# Patient Record
Sex: FEMALE | Race: ASIAN | NOT HISPANIC OR LATINO | Employment: UNEMPLOYED | URBAN - METROPOLITAN AREA
[De-identification: names, ages, dates, MRNs, and addresses within clinical notes are randomized per-mention and may not be internally consistent; named-entity substitution may affect disease eponyms.]

---

## 2022-07-15 ENCOUNTER — VIRTUAL VISIT (OUTPATIENT)
Dept: INTERNAL MEDICINE | Facility: CLINIC | Age: 47
End: 2022-07-15
Payer: COMMERCIAL

## 2022-07-15 DIAGNOSIS — R07.9 CHEST PAIN, UNSPECIFIED TYPE: Primary | ICD-10-CM

## 2022-07-15 PROCEDURE — 99204 OFFICE O/P NEW MOD 45 MIN: CPT | Mod: 95 | Performed by: INTERNAL MEDICINE

## 2022-07-15 NOTE — PROGRESS NOTES
Janell is a 46 year old who is being evaluated via a billable video visit.      How would you like to obtain your AVS? MyChart  If the video visit is dropped, the invitation should be resent by: Send to e-mail at: [email or web address]  Will anyone else be joining your video visit? No    Assessment & Plan     Chest pain, unspecified type  Unclear etiology. Given that it is always present and nothing makes it better/worse, I think ACS is lower on the DDX. Other possibilities include myocarditis (started after recent viral illness though doesn't worsen with position) vs PE (no other symptoms of PE though no vitals done today given virtual visit) vs GERD vs MSK strain vs other. Discussed all of these with Janell. Initially she was interested in pursuing blood tests + TTE + EKG but then changed her mind, preferring to see a provider near her (she lives in [place], our clinic is in [place]) instead. She plans to monitor her symptoms and see a provider near her in the coming days. I discussed ER precautions repeatedly, and she agreed to present if this pain worsens or additional symptoms occur.    F/u with provider as choice as above    Ron Payton MD  [facility]    Subjective   Janell is a 46 year old presenting via video visit for the following health issues. This is the first time I have met Janell.    HPI     Chest Pain  Onset/Duration: 2.5 weeks  Description:   Location: upper left side to  Under left breast, than to side and now in back on left side   Character: sharp and heavy  Radiation: on left side  Duration: constant   Intensity: mild, moderate  Progression of Symptoms: same  Accompanying Signs & Symptoms:  Shortness of breath: No  Sweating: No  Nausea/vomiting: No  Lightheadedness: No  Palpitations: No  Fever/Chills: No  Cough: YES started with a cold            Heartburn: YES  History:   Family history of heart disease: No  Tobacco use: No  Previous similar  "symptoms: no   Precipitating factors:   Worse with exertion: No  Worse with deep breaths: YES           Related to eating: No           Better with burping: No  Alleviating factors:   Therapies tried and outcome:     Has not had BP checked since arlene at mother-in-law's. Exercises every day. Reports she has acid reflux and indigestion a lot. Reports the last 2.5 weeks have had a different chest pain. Hurts to breath. Started L side, upper breast, then moved to below breast, now onto her back. Pain doesn't impact her to the point where she doesn't live her day to day life. It's always there, less than a 5 out of 10. \"It's always there, nothing makes it better or worse.\" Sleeps fine with it. There when she falls asleep and when she wakes up. Doesn't get worse when she lays down. No known personal medical history. No family history of cardiac disease that she knows about. Pain started when she had a mild cold. No leg swelling or difficulty breathing.    Review of Systems   Constitutional, HEENT, cardiovascular, pulmonary, gi systems are negative, except as otherwise noted.      Objective       Vitals: No vitals were obtained today due to virtual visit.    Physical Exam   GENERAL: Healthy, alert and no distress  EYES: Eyes grossly normal to inspection.  No discharge or erythema, or obvious scleral/conjunctival abnormalities.  RESP: No audible wheeze, cough, or visible cyanosis.  No visible retractions or increased work of breathing.    SKIN: Visible skin clear. No significant rash, abnormal pigmentation or lesions.  NEURO: Cranial nerves grossly intact.  Mentation and speech appropriate for age.  PSYCH: Mentation appears normal, affect normal/bright, judgement and insight intact, normal speech and appearance well-groomed.    Video-Visit Details  Video Start Time: 3:37 PM    Type of service: Video Visit    Video End Time: 3:54 PM    Originating Location (pt. Location): Home    Distant Location (provider location):  M " [facility]     Platform used for Video Visit: [employer]

## 2022-08-04 ENCOUNTER — OFFICE VISIT (OUTPATIENT)
Dept: FAMILY MEDICINE | Facility: CLINIC | Age: 47
End: 2022-08-04
Payer: COMMERCIAL

## 2022-08-04 VITALS
WEIGHT: 157 LBS | HEART RATE: 87 BPM | BODY MASS INDEX: 24.64 KG/M2 | HEIGHT: 67 IN | DIASTOLIC BLOOD PRESSURE: 86 MMHG | TEMPERATURE: 97 F | SYSTOLIC BLOOD PRESSURE: 128 MMHG | OXYGEN SATURATION: 100 %

## 2022-08-04 DIAGNOSIS — N92.6 IRREGULAR MENSES: ICD-10-CM

## 2022-08-04 DIAGNOSIS — R07.89 DISCOMFORT IN CHEST: ICD-10-CM

## 2022-08-04 DIAGNOSIS — F41.1 GAD (GENERALIZED ANXIETY DISORDER): Primary | ICD-10-CM

## 2022-08-04 PROCEDURE — 96127 BRIEF EMOTIONAL/BEHAV ASSMT: CPT | Performed by: PHYSICIAN ASSISTANT

## 2022-08-04 PROCEDURE — 99214 OFFICE O/P EST MOD 30 MIN: CPT | Performed by: PHYSICIAN ASSISTANT

## 2022-08-04 PROCEDURE — 93000 ELECTROCARDIOGRAM COMPLETE: CPT | Performed by: PHYSICIAN ASSISTANT

## 2022-08-04 RX ORDER — CITALOPRAM HYDROBROMIDE 20 MG/1
TABLET ORAL
Qty: 30 TABLET | Refills: 1 | Status: SHIPPED | OUTPATIENT
Start: 2022-08-04 | End: 2022-09-26

## 2022-08-04 ASSESSMENT — ANXIETY QUESTIONNAIRES
2. NOT BEING ABLE TO STOP OR CONTROL WORRYING: SEVERAL DAYS
8. IF YOU CHECKED OFF ANY PROBLEMS, HOW DIFFICULT HAVE THESE MADE IT FOR YOU TO DO YOUR WORK, TAKE CARE OF THINGS AT HOME, OR GET ALONG WITH OTHER PEOPLE?: SOMEWHAT DIFFICULT
7. FEELING AFRAID AS IF SOMETHING AWFUL MIGHT HAPPEN: NOT AT ALL
IF YOU CHECKED OFF ANY PROBLEMS ON THIS QUESTIONNAIRE, HOW DIFFICULT HAVE THESE PROBLEMS MADE IT FOR YOU TO DO YOUR WORK, TAKE CARE OF THINGS AT HOME, OR GET ALONG WITH OTHER PEOPLE: SOMEWHAT DIFFICULT
GAD7 TOTAL SCORE: 11
3. WORRYING TOO MUCH ABOUT DIFFERENT THINGS: NOT AT ALL
6. BECOMING EASILY ANNOYED OR IRRITABLE: NEARLY EVERY DAY
5. BEING SO RESTLESS THAT IT IS HARD TO SIT STILL: NEARLY EVERY DAY
GAD7 TOTAL SCORE: 11
1. FEELING NERVOUS, ANXIOUS, OR ON EDGE: SEVERAL DAYS
GAD7 TOTAL SCORE: 11
4. TROUBLE RELAXING: NEARLY EVERY DAY
7. FEELING AFRAID AS IF SOMETHING AWFUL MIGHT HAPPEN: NOT AT ALL

## 2022-08-04 ASSESSMENT — PAIN SCALES - GENERAL: PAINLEVEL: NO PAIN (0)

## 2022-08-04 ASSESSMENT — PATIENT HEALTH QUESTIONNAIRE - PHQ9: SUM OF ALL RESPONSES TO PHQ QUESTIONS 1-9: 3

## 2022-08-04 NOTE — PATIENT INSTRUCTIONS
Okay to schedule your routine appointment with GYN for Pap and mammogram and prescription for hormones.       I will see you in 4-6 weeks for follow up of medication for anxiety

## 2022-08-04 NOTE — NURSING NOTE
"Chief Complaint   Patient presents with     Establish Care       Initial BP (!) 135/91   Pulse 87   Temp 97  F (36.1  C) (Tympanic)   Ht 1.689 m (5' 6.5\")   Wt 71.2 kg (157 lb)   LMP 07/29/2022   SpO2 100%   BMI 24.96 kg/m   Estimated body mass index is 24.96 kg/m  as calculated from the following:    Height as of this encounter: 1.689 m (5' 6.5\").    Weight as of this encounter: 71.2 kg (157 lb).  Medication Reconciliation: complete    ALLISON Michaels MA    "

## 2022-08-04 NOTE — PROGRESS NOTES
"    Assessment & Plan     AGNESH (generalized anxiety disorder)  Discussed treatments with counseling and medications.   Risks, benefits and side effects reviewed.   She is going to start citalopram.   Plan follow up in 1-2 months, sooner if needed.   - citalopram (CELEXA) 20 MG tablet; 1/2 tablet daily x 6 days, then increase to 1 tablet daily    Discomfort in chest  EKG completed today and normal.   She declines further testing at this time.   The pain has resolved. Suspect it was most likely GERD.   - EKG 12-lead complete w/read - Clinics    Irregular menses  Referral placed.   Discuss fertility and progesterone, she plans to have her routine exam for Pap screening.   Defers scheduling mammogram.   - Ob/Gyn Referral; Future             30 minutes spent with Janell today, documentation, review of chart, appointment and discussion    Return in about 1 month (around 9/4/2022) for depression / anxiety, virtual video visit.    Kristen M. Kehr, PA-C [facility]   Janell is a 46 year old, presenting for the following health issues:  Establish Care    Histoyry of gestational diabetes. She is checked annually for diabetes and has not developed type 2 diabetes.   She gained weigh after her pregnancy. She has developed intolerances to foods.   She saw a hormone provider and had a \"Luxembourger\" test. She was told that her cortisol was \"off the charts\". She is an anxious person by nature.   Thyroid tests were normal.   She has very vivid dreams every night and feels anxious all of the time.   She has been taking medications / supplements from the hormone provider. She is taking supplements in the morning to help wake and then more to help settle down.   She has a hard time focusing.   She started having chest pains last month. She started to work on calming exercises with yoga and low intensity. Trying more calming exercises.   The chest pain is no longer present. She believes she may have had " "more acid reflux. She continues to have heartburn occasionally.   She does not have chest pain with activity.       She also will need a referral to GYN for management of her irregular menses, she has been taking progesterone monthly. She had fertility treatments in the [facility], she has some questions about trying to have more children in the future.       History of Present Illness       Mental Health Follow-up:  Patient presents to follow-up on Anxiety.    Patient's anxiety since last visit has been:  Good  The patient is having other symptoms associated with anxiety.  Any significant life events: relationship concerns, job concerns, financial concerns, housing concerns, grief or loss, health concerns and other  Patient is not feeling anxious or having panic attacks.  Patient has no concerns about alcohol or drug use.    She eats 2-3 servings of fruits and vegetables daily.She consumes 0 sweetened beverage(s) daily.She exercises with enough effort to increase her heart rate 60 or more minutes per day.  She exercises with enough effort to increase her heart rate 7 days per week.   She is taking medications regularly.  Today's GANESH-7 Score: 11           Review of Systems   Constitutional, HEENT, cardiovascular, pulmonary, GI, , musculoskeletal, neuro, skin, endocrine and psych systems are negative, except as otherwise noted.      Objective    /86   Pulse 87   Temp 97  F (36.1  C) (Tympanic)   Ht 1.689 m (5' 6.5\")   Wt 71.2 kg (157 lb)   LMP 07/29/2022   SpO2 100%   BMI 24.96 kg/m    Body mass index is 24.96 kg/m .  Physical Exam   GENERAL: healthy, alert and no distress  NECK: no adenopathy, no asymmetry, masses, or scars and thyroid normal to palpation  RESP: lungs clear to auscultation - no rales, rhonchi or wheezes  CV: regular rate and rhythm, normal S1 S2, no S3 or S4, no murmur, click or rub, no peripheral edema and peripheral pulses strong  ABDOMEN: soft, nontender, no hepatosplenomegaly, no " masses and bowel sounds normal  MS: no gross musculoskeletal defects noted, no edema  SKIN: no suspicious lesions or rashes  NEURO: Normal strength and tone, mentation intact and speech normal  BACK: no CVA tenderness, no paralumbar tenderness  PSYCH: mentation appears normal, affect normal/bright, anxious, judgement and insight intact and appearance well groomed    EKG - Reviewed and interpreted by me appears normal, NSR, normal axis, normal intervals, no acute ST/T changes c/w ischemia, no LVH by voltage criteria, unchanged from previous tracings                .  ..

## 2022-09-12 ENCOUNTER — OFFICE VISIT (OUTPATIENT)
Dept: OBGYN | Facility: CLINIC | Age: 47
End: 2022-09-12
Payer: COMMERCIAL

## 2022-09-12 VITALS
SYSTOLIC BLOOD PRESSURE: 142 MMHG | BODY MASS INDEX: 25.65 KG/M2 | OXYGEN SATURATION: 95 % | DIASTOLIC BLOOD PRESSURE: 95 MMHG | WEIGHT: 163.4 LBS | HEART RATE: 78 BPM | HEIGHT: 67 IN

## 2022-09-12 DIAGNOSIS — Z13.1 SCREENING FOR DIABETES MELLITUS: ICD-10-CM

## 2022-09-12 DIAGNOSIS — Z12.31 VISIT FOR SCREENING MAMMOGRAM: ICD-10-CM

## 2022-09-12 DIAGNOSIS — Z01.419 ENCOUNTER FOR GYNECOLOGICAL EXAMINATION WITHOUT ABNORMAL FINDING: Primary | ICD-10-CM

## 2022-09-12 DIAGNOSIS — Z13.220 SCREENING FOR HYPERLIPIDEMIA: ICD-10-CM

## 2022-09-12 DIAGNOSIS — N92.6 IRREGULAR MENSTRUAL CYCLE: ICD-10-CM

## 2022-09-12 PROCEDURE — 87624 HPV HI-RISK TYP POOLED RSLT: CPT | Performed by: NURSE PRACTITIONER

## 2022-09-12 PROCEDURE — 99386 PREV VISIT NEW AGE 40-64: CPT | Performed by: NURSE PRACTITIONER

## 2022-09-12 PROCEDURE — G0145 SCR C/V CYTO,THINLAYER,RESCR: HCPCS | Performed by: NURSE PRACTITIONER

## 2022-09-12 ASSESSMENT — ENCOUNTER SYMPTOMS
NERVOUS/ANXIOUS: 0
CHILLS: 0
PALPITATIONS: 0
ARTHRALGIAS: 0
EYE PAIN: 0
HEARTBURN: 1
WEAKNESS: 0
DIZZINESS: 0
PARESTHESIAS: 0
FEVER: 0
COUGH: 0
SHORTNESS OF BREATH: 0
SORE THROAT: 0
JOINT SWELLING: 0
MYALGIAS: 0
BREAST MASS: 0
HEMATOCHEZIA: 0
FREQUENCY: 0
HEMATURIA: 0
DIARRHEA: 0
ABDOMINAL PAIN: 0
NAUSEA: 0
CONSTIPATION: 0
HEADACHES: 0
DYSURIA: 0

## 2022-09-12 ASSESSMENT — PAIN SCALES - GENERAL: PAINLEVEL: NO PAIN (0)

## 2022-09-12 NOTE — PATIENT INSTRUCTIONS
If you have any questions regarding your visit, Please contact your care team.     [facility]: [phone]  To Schedule an Appointment 24/7  Call: [phone] HOURS TELEPHONE NUMBER     Ana Archer- APRN CNP      Roxana Santizo-RORO Vila-RORO [place]-Surgery Scheduler  Kamila-Surgery Scheduler         Monday 7:30 am-5:00 pm    Tuesday 8:00 am-4:00 pm    Wednesday 7:30 am-4:00 pm  [place]    Thursday 8:00 am-11:00 am    Friday 7:30 am-4:00 pm [place] [address] [place] [address] [phone] ask for [facility]  [phone] Fax    Imaging Scheduling all locations  [phone]     [facility]  [address] [place] [address] Dr.  [place], [place] [id number] [phone]         Urgent Care locations:  [facility]   Monday-Friday  10 am - 8 pm  Saturday and Sunday   9 am - 5 pm     [phone]   If you need a medication refill, please contact your pharmacy. Please allow 3 business days for your refill to be completed.  As always, Thank you for trusting us with your healthcare needs!      see additional instructions from your care team below

## 2022-09-12 NOTE — PROGRESS NOTES
SUBJECTIVE:   CC: Aretha Wheatley is an 46 year old woman who presents for preventive health visit.     {Healthy Habits:     Getting at least 3 servings of Calcium per day:  Yes    Bi-annual eye exam:  NO    Dental care twice a year:  Yes    Sleep apnea or symptoms of sleep apnea:  None    Diet:  Gluten-free/reduced    Frequency of exercise:  6-7 days/week    Duration of exercise:  Greater than 60 minutes    Taking medications regularly:  Yes    Medication side effects:  None    PHQ-2 Total Score: 0    Additional concerns today:  No    Patient has a history of irregular menstrual cycles. Her OB provider in the [facility] started her on a progesterone that she takes monthly starting the 20th day of the month for 1 week. When she moved to [place], her provider found the equivalent prescription and prescribed this for her, she cannot recall the medication, but will check her prescription at home as she needs a refill. Occasionally does it orally, occasionally takes it vaginally.  She plans to do this through this year and if she is not pregnant at that time, she plans to follow up to discuss contraception as then she would have turned 47 and would not want to become pregnant. Tried a combined oral contraceptive pill in the past with side effects, which is why the cyclic progesterone was started. Has declined any additional intervention to help become pregnant.      Today's PHQ-2 Score:   PHQ-2 ( 1999 Pfizer) 9/12/2022   Q1: Little interest or pleasure in doing things 0   Q2: Feeling down, depressed or hopeless 0   PHQ-2 Score 0   Q1: Little interest or pleasure in doing things Not at all   Q2: Feeling down, depressed or hopeless Not at all   PHQ-2 Score 0       Abuse: Current or Past (Physical, Sexual or Emotional) - No  Do you feel safe in your environment? Yes        Social History     Tobacco Use     Smoking status: Never Smoker     Smokeless tobacco: Never Used   Substance Use Topics     Alcohol  use: Yes         Alcohol Use 9/12/2022   Prescreen: >3 drinks/day or >7 drinks/week? No   No flowsheet data found.    Reviewed orders with patient.  Reviewed health maintenance and updated orders accordingly - Yes  Patient Active Problem List   Diagnosis     GANESH (generalized anxiety disorder)     Past Surgical History:   Procedure Laterality Date     C/SECTION, CLASSICAL         Social History     Tobacco Use     Smoking status: Never Smoker     Smokeless tobacco: Never Used   Substance Use Topics     Alcohol use: Yes     Family History   Problem Relation Age of Onset     Cervical Cancer Mother            Breast Cancer Screening:  Any new diagnosis of family breast, ovarian, or bowel cancer? No    Mammogram Screening: Recommended annual mammography  Pertinent mammograms are reviewed under the imaging tab.    History of abnormal Pap smear: NO - age 30-65 PAP every 5 years with negative HPV co-testing recommended     Reviewed and updated as needed this visit by clinical staff   Tobacco  Allergies  Meds  Problems  Med Hx  Surg Hx  Fam Hx  Soc   Hx          Reviewed and updated as needed this visit by Provider   Tobacco  Allergies  Meds  Problems  Med Hx  Surg Hx  Fam Hx  Soc   Hx         Past Medical History:   Diagnosis Date     No pertinent past medical history       Past Surgical History:   Procedure Laterality Date     C/SECTION, CLASSICAL         Review of Systems  CONSTITUTIONAL: NEGATIVE for fever, chills, change in weight  INTEGUMENTARU/SKIN: NEGATIVE for worrisome rashes, moles or lesions  EYES: NEGATIVE for vision changes or irritation  ENT: NEGATIVE for ear, mouth and throat problems  RESP: NEGATIVE for significant cough or SOB  BREAST: NEGATIVE for masses, tenderness or discharge  CV: NEGATIVE for chest pain, palpitations or peripheral edema  GI: NEGATIVE for nausea, abdominal pain, heartburn, or change in bowel habits  : NEGATIVE for unusual urinary or vaginal symptoms. Periods: see  "above.  MUSCULOSKELETAL: NEGATIVE for significant arthralgias or myalgia  NEURO: NEGATIVE for weakness, dizziness or paresthesias  PSYCHIATRIC: NEGATIVE for changes in mood or affect     OBJECTIVE:   BP (!) 142/95 (BP Location: Right arm, Patient Position: Sitting, Cuff Size: Adult Regular)   Pulse 78   Ht 1.689 m (5' 6.5\")   Wt 74.1 kg (163 lb 6.4 oz)   LMP 08/29/2022 (Exact Date)   SpO2 95%   BMI 25.98 kg/m    Physical Exam  GENERAL: healthy, alert and no distress  NECK: no adenopathy, no asymmetry, masses, or scars and thyroid normal to palpation  RESP: lungs clear to auscultation - no rales, rhonchi or wheezes  BREAST: normal without masses, tenderness or nipple discharge and no palpable axillary masses or adenopathy  CV: regular rate and rhythm, normal S1 S2, no S3 or S4, no murmur, click or rub, no peripheral edema and peripheral pulses strong  ABDOMEN: soft, nontender, no hepatosplenomegaly, no masses and bowel sounds normal   (female): normal female external genitalia, normal urethral meatus, vaginal mucosa pink, moist, well rugated, and normal cervix/adnexa/uterus without masses or discharge  MS: no gross musculoskeletal defects noted, no edema  SKIN: no suspicious lesions or rashes  NEURO: Normal strength and tone, mentation intact and speech normal  PSYCH: mentation appears normal, affect normal/bright    ASSESSMENT/PLAN:   (Z01.419) Encounter for gynecological examination without abnormal finding  (primary encounter diagnosis)  Comment: Declines colon cancer screening at this time  Plan: Pap Screen with HPV - recommended age 30 - 65         years         (Z12.31) Visit for screening mammogram  Plan: MA SCREENING DIGITAL BILAT - Future  (s+30)      (Z13.220) Screening for hyperlipidemia  Plan: Lipid panel reflex to direct LDL Non-fasting        (Z13.1) Screening for diabetes mellitus  Plan: Glucose          (N92.6) Irregular menstrual cycle  Comment: We discussed her previous history and " "medication use. Patient would like to continue on this medication regimen for the rest of this year. Declines any additional work up/intervention to help become pregnant. If it does not spontaneously occur by the end of the year, plans to return to clinic to discuss contraception. Patient will check her medication at home and call with prescription information-suspect it is prometrium as it can be used orally or vaginally. Would recommend oral use and this is discussed. If it is an alternate medication, will review to be sure it is an appropriate treatment for her.    COUNSELING:  Reviewed preventive health counseling, as reflected in patient instructions  Special attention given to:        Regular exercise       Healthy diet/nutrition       Contraception       Family planning       Colorectal Cancer Screening       Consider Hep C screening for all patients one time for ages 18-79 years       HIV screeninx in teen years, 1x in adult years, and at intervals if high risk    Estimated body mass index is 25.98 kg/m  as calculated from the following:    Height as of this encounter: 1.689 m (5' 6.5\").    Weight as of this encounter: 74.1 kg (163 lb 6.4 oz).        She reports that she has never smoked. She has never used smokeless tobacco.      Counseling Resources:  ATP IV Guidelines  Pooled Cohorts Equation Calculator  Breast Cancer Risk Calculator  BRCA-Related Cancer Risk Assessment: FHS-7 Tool  FRAX Risk Assessment  ICSI Preventive Guidelines  Dietary Guidelines for Americans, 2010  USDA's MyPlate  ASA Prophylaxis  Lung CA Screening    LALO Monreal [facility]  "

## 2022-09-14 LAB
BKR LAB AP GYN ADEQUACY: NORMAL
BKR LAB AP GYN INTERPRETATION: NORMAL
BKR LAB AP HPV REFLEX: NORMAL
BKR LAB AP PREVIOUS ABNORMAL: NORMAL
PATH REPORT.COMMENTS IMP SPEC: NORMAL
PATH REPORT.COMMENTS IMP SPEC: NORMAL
PATH REPORT.RELEVANT HX SPEC: NORMAL

## 2022-09-16 LAB
HUMAN PAPILLOMA VIRUS 16 DNA: NEGATIVE
HUMAN PAPILLOMA VIRUS 18 DNA: NEGATIVE
HUMAN PAPILLOMA VIRUS FINAL DIAGNOSIS: NORMAL
HUMAN PAPILLOMA VIRUS OTHER HR: NEGATIVE

## 2022-09-26 ENCOUNTER — TELEPHONE (OUTPATIENT)
Dept: OBGYN | Facility: CLINIC | Age: 47
End: 2022-09-26

## 2022-09-26 ENCOUNTER — VIRTUAL VISIT (OUTPATIENT)
Dept: FAMILY MEDICINE | Facility: CLINIC | Age: 47
End: 2022-09-26
Payer: COMMERCIAL

## 2022-09-26 DIAGNOSIS — N92.6 IRREGULAR MENSTRUAL CYCLE: Primary | ICD-10-CM

## 2022-09-26 DIAGNOSIS — F41.1 GAD (GENERALIZED ANXIETY DISORDER): ICD-10-CM

## 2022-09-26 PROCEDURE — 99213 OFFICE O/P EST LOW 20 MIN: CPT | Mod: 95 | Performed by: PHYSICIAN ASSISTANT

## 2022-09-26 RX ORDER — CITALOPRAM HYDROBROMIDE 20 MG/1
30 TABLET ORAL DAILY
Qty: 135 TABLET | Refills: 1 | Status: SHIPPED | OUTPATIENT
Start: 2022-09-26 | End: 2022-09-27

## 2022-09-26 ASSESSMENT — ANXIETY QUESTIONNAIRES
3. WORRYING TOO MUCH ABOUT DIFFERENT THINGS: SEVERAL DAYS
2. NOT BEING ABLE TO STOP OR CONTROL WORRYING: SEVERAL DAYS
5. BEING SO RESTLESS THAT IT IS HARD TO SIT STILL: SEVERAL DAYS
GAD7 TOTAL SCORE: 6
7. FEELING AFRAID AS IF SOMETHING AWFUL MIGHT HAPPEN: NOT AT ALL
1. FEELING NERVOUS, ANXIOUS, OR ON EDGE: SEVERAL DAYS
6. BECOMING EASILY ANNOYED OR IRRITABLE: SEVERAL DAYS
GAD7 TOTAL SCORE: 6

## 2022-09-26 ASSESSMENT — PATIENT HEALTH QUESTIONNAIRE - PHQ9: 5. POOR APPETITE OR OVEREATING: SEVERAL DAYS

## 2022-09-26 NOTE — TELEPHONE ENCOUNTER
Pt was only able to read Aurobindo and progesterone from her last pill bottle label. She is interested in continuing a progesterone or something similar based on Ana's recommendation.    She use to take it the 20th of every month to get her period. This cycle her period came 3 weeks early on 9/12. She needs to know which day she should take it now that her cycle is off.    Routing to provider.    Patricia Huynh RN on 9/26/2022 at 1:38 PM

## 2022-09-26 NOTE — PROGRESS NOTES
Janell is a 46 year old who is being evaluated via a billable video visit.      How would you like to obtain your AVS? MyChart  If the video visit is dropped, the invitation should be resent by: Text to cell phone: [phone]  Will anyone else be joining your video visit? No        Assessment & Plan     GANESH (generalized anxiety disorder)  Increase the dose of the citalopram to 30 mg daily.   Recheck with an office visit in 6 months in the clinic. Her blood pressure was high at her routine exam and she will need a recheck of that also at that time.   - citalopram (CELEXA) 20 MG tablet; Take 1.5 tablets (30 mg) by mouth daily                 Return in about 6 months (around 3/26/2023) for depression / anxiety office visit in the clinic.    Kristen M. Kehr, PA-C [facility]    [facility]   Janell is a 46 year old, presenting for the following health issues:  Anxiety    She started the citalopram and is tolerating the medication well.   She feels it is helping with her anxiety. She takes the medication at night. She has had some improvement with her dreams, they are not as vivid.   Overall, feels that it is working, but could use a dose adjustment.     She has not started counseling. She feels she does not have time at this point.       HPI     Anxiety Follow-Up    How are you doing with your anxiety since your last visit? mild    Are you having other symptoms that might be associated with anxiety? Bad dreams    Have you had a significant life event? No     Are you feeling depressed? No    Do you have any concerns with your use of alcohol or other drugs? No    Social History     Tobacco Use     Smoking status: Never Smoker     Smokeless tobacco: Never Used   Vaping Use     Vaping Use: Never used   Substance Use Topics     Alcohol use: Yes     Drug use: Never     GANESH-7 SCORE 8/4/2022 9/26/2022   Total Score 11 (moderate anxiety) -   Total Score 11 6     PHQ 8/4/2022   PHQ-9 Total Score 3    Q9: Thoughts of better off dead/self-harm past 2 weeks Not at all     Last PHQ-9 8/4/2022   1.  Little interest or pleasure in doing things 0   2.  Feeling down, depressed, or hopeless 0   3.  Trouble falling or staying asleep, or sleeping too much 0   4.  Feeling tired or having little energy 0   5.  Poor appetite or overeating 0   6.  Feeling bad about yourself 0   7.  Trouble concentrating 3   8.  Moving slowly or restless 0   Q9: Thoughts of better off dead/self-harm past 2 weeks 0   PHQ-9 Total Score 3   Difficulty at work, home, or with people Somewhat difficult     GANESH-7  9/26/2022   1. Feeling nervous, anxious, or on edge 1   2. Not being able to stop or control worrying 1   3. Worrying too much about different things 1   4. Trouble relaxing 1   5. Being so restless that it is hard to sit still 1   6. Becoming easily annoyed or irritable 1   7. Feeling afraid, as if something awful might happen 0   GANESH-7 Total Score 6   If you checked any problems, how difficult have they made it for you to do your work, take care of things at home, or get along with other people? -             Review of Systems   Constitutional, HEENT, cardiovascular, pulmonary, GI, , musculoskeletal, neuro, skin, endocrine and psych systems are negative, except as otherwise noted.      Objective           Vitals:  No vitals were obtained today due to virtual visit.    Physical Exam   GENERAL: Healthy, alert and no distress  EYES: Eyes grossly normal to inspection.  No discharge or erythema, or obvious scleral/conjunctival abnormalities.  RESP: No audible wheeze, cough, or visible cyanosis.  No visible retractions or increased work of breathing.    SKIN: Visible skin clear. No significant rash, abnormal pigmentation or lesions.  NEURO: Cranial nerves grossly intact.  Mentation and speech appropriate for age.  PSYCH: Mentation appears normal, affect normal/bright, judgement and insight intact, normal speech and appearance  well-groomed.                Video-Visit Details    Video Start Time: 9:55 AM    Type of service:  Video Visit    Video End Time:10:08 AM    Originating Location (pt. Location): Home    Distant Location (provider [facility]):  [facility]     Platform used for Video Visit: [employer]

## 2022-09-26 NOTE — TELEPHONE ENCOUNTER
Health Call Center    Phone Message    May a detailed message be left on voicemail: yes     Reason for Call: Patient wants to let Ana Archer know the name of the medication she is on is Aurobindo.  She takes this on the 20th of every month.  She did not take it on the 20th because she got her period 3 weeks earlier.  Please call pt back to discuss.    Action Taken: Message routed to:  [facility] p [phone]    Travel Screening: Not Applicable

## 2022-09-26 NOTE — TELEPHONE ENCOUNTER
Called pt, pt not available. Unable to leave a voicemail.    Patricia Huynh RN on 9/26/2022 at 1:05 PM

## 2022-09-26 NOTE — TELEPHONE ENCOUNTER
Did patient have medication that she took this month or in August at all or has she been out? Normally I believe she took her medication on the 20th of the month for 1 week-how quickly did she have a cycle then? If this most recent bleeding was due to not having medication, would recommend she start on Oct 20 if she does not get a cycle sooner. Please advise her I would only prescribe this for the few months to the end of the year, this is not something we would keep her on indefinitely-if she decides against pregnancy then after the end of the year, would recommend follow up to discuss contraception. Ana MAY CNP

## 2022-09-26 NOTE — TELEPHONE ENCOUNTER
This medication is not in our database. When I do an online search, looks like [employer] is a pharmaceutical company and one of the products they manufacture is Promerium. Based on the description she provided at our visit, suspect this is what she was on.  Is she sure the prescription is [employer]? I am not able to order this medication if that is what she is using, but we could do an alternate progesterone if she would like. I know she was only planning to do this through the end of the year, then would look at a regular contraceptive medication. Ana MAY CNP

## 2022-09-27 ENCOUNTER — TELEPHONE (OUTPATIENT)
Dept: FAMILY MEDICINE | Facility: CLINIC | Age: 47
End: 2022-09-27

## 2022-09-27 DIAGNOSIS — F41.1 GAD (GENERALIZED ANXIETY DISORDER): ICD-10-CM

## 2022-09-27 RX ORDER — PROGESTERONE 100 MG/1
100 CAPSULE ORAL DAILY
Qty: 10 CAPSULE | Refills: 2 | Status: SHIPPED | OUTPATIENT
Start: 2022-09-27 | End: 2023-02-20

## 2022-09-27 RX ORDER — CITALOPRAM HYDROBROMIDE 40 MG/1
40 TABLET ORAL DAILY
Qty: 90 TABLET | Refills: 1 | Status: SHIPPED | OUTPATIENT
Start: 2022-09-27 | End: 2023-05-12

## 2022-09-27 NOTE — TELEPHONE ENCOUNTER
Agree with RN recommendation-to call if cycle begins on its own earlier. From what she said, she was started on this due to irregular menstrual cycles. If her cycles are coming monthly on their own, would not necessarily need the medication. If she continues to have questions, can always schedule telephone visit. Ana MAY CNP

## 2022-09-27 NOTE — TELEPHONE ENCOUNTER
Called pt, unable to reach her. No voicemail option or mychart.     Patricia Huynh RN on 9/27/2022 at 8:43 AM

## 2022-09-27 NOTE — TELEPHONE ENCOUNTER
Patient wondering if can take 40mg so taking 2 tablets instead of having to break in half every day    Please advise    Shawna RICHMONDN, RN

## 2022-09-27 NOTE — TELEPHONE ENCOUNTER
Spoke with pt. She will  rx. She is concerned her cycle will be off again in Oct and start sooner then 10/20 without the progesterone. Advised pt to call the clinic if cycle begins early on it's own again.    Routing to provider for advisement and pt follow-up.    Patricia Huynh RN on 9/27/2022 at 2:34 PM

## 2022-09-27 NOTE — TELEPHONE ENCOUNTER
Spoke with pt and confirmed plan to contact clinic if cycle arrives early. She will make telephone visit to revisit plan if need be.    Patricia Huynh RN on 9/27/2022 at 2:59 PM

## 2022-09-27 NOTE — TELEPHONE ENCOUNTER
Pt normally takes the medication for 10 days starting the 20th of every month. Her cycle normally starts before the 10 days are up or within two weeks of the 20th. Her last dose taken was Aug 20th, she got a cycle, then got another cycle that started on it's own 9/12 so she skipped the 9/20 dose.     She is in agreement to only use progesterone through 2022. She has 1 remaining pill from that Rx (the dose she skipped 9/20).    She is open to trying something other than progesterone also, if there are other treatment options that would be better for her.    Patricia Huynh RN on 9/27/2022 at 9:14 AM

## 2022-09-27 NOTE — TELEPHONE ENCOUNTER
Prescription sent for Prometrium to take for 10 days each month starting on October 20. Refills x 2 additional to get her through December. At that time if she would like contraception as she said she likely would desire early next year, recommend a follow up appointment to review options. Ana MAY CNP

## 2022-09-27 NOTE — TELEPHONE ENCOUNTER
Per chart review pt had Virtual Visit with PCP yesterday and per note below is now requesting an additional citalopram dosage increase. Please review and advise.    Attempted to reach pt for more information regarding why she is requesting to increase to a 40 mg dose, instead of the proposed increase up to 30 mg as planned during virtual visit yesterday. There was no answer. Left message to return call to a nurse at [phone].    Agnes Patterson, BASILION, RN

## 2022-09-27 NOTE — TELEPHONE ENCOUNTER
[facility] Call Center    Phone Message    May a detailed message be left on voicemail: yes     Reason for Hitesh:     Janell Wheatley said she missed a call from Ana Archer's office.    Action Taken: Message routed to:  [facility] p [id number]    Travel Screening: Not Applicable

## 2022-09-27 NOTE — TELEPHONE ENCOUNTER
Patient notified of provider's message as written below. It was brought to her attention that the 40 mg tablets have been sent and when she picks this up she is to only take one tablet daily. Patient verbalized good understanding, had no further questions and needed no further support.Wandy Cruz R.N.

## 2022-09-27 NOTE — TELEPHONE ENCOUNTER
Yes, She can try to make the change by going to 40 mg.   I can send a new prescription for the 40 mg tablets since there is a tablet in that dose.   Kristen Kehr PA-C

## 2022-10-12 DIAGNOSIS — F41.1 GAD (GENERALIZED ANXIETY DISORDER): ICD-10-CM

## 2022-10-14 RX ORDER — CITALOPRAM HYDROBROMIDE 20 MG/1
TABLET ORAL
Qty: 30 TABLET | Refills: 1 | OUTPATIENT
Start: 2022-10-14

## 2022-10-17 ENCOUNTER — TELEPHONE (OUTPATIENT)
Dept: FAMILY MEDICINE | Facility: CLINIC | Age: 47
End: 2022-10-17

## 2022-10-17 NOTE — TELEPHONE ENCOUNTER
Medication Question or Refill    Contacts       Type Contact Phone/Fax    10/17/2022 09:38 AM CDT Phone (Incoming) Janell Wheatley (Self) [phone] (H)          What medication are you calling about (include dose and sig)?: Citolapram    Controlled Substance Agreement on file:   CSA -- Patient Level:    CSA: None found at the patient level.       Who prescribed the medication?: Dr Kehr    Do you need a refill? Yes:     When did you use the medication last? Taking half currently    Patient offered an appointment? No, already had appt 9/26    Do you have any questions or concerns?  No     Preferred Pharmacy:   [id number] [place], [place] - [address]  [address] [facility] [address]  Phone: [phone] Fax: [phone]      Okay to leave a detailed message?: Yes at Home number on file [phone] (home)

## 2022-10-19 NOTE — TELEPHONE ENCOUNTER
Patient called to clinic today regarding her Citalopram refill. Said she contacted her pharmacy and they don't have a prescription on file to fill for her. Was told to contact back to PCP office.    Per review of chart, patient last prescribed Citalopram 40 mg tab, once daily by PCP on 9/27/22. New Rx was sent to [facility] in [place] at that time. Should be on file at pharmacy.    Writer called to pharmacy to clarify. Discovered that patient has 2 separate accounts with pharmacy and this is causing the confusion with refills.  Pharmacist was able to locate the 40 mg tabs prescribed on 9/27 and will refill this Rx now today. Pharmacist advised for patient to call back to them to straighten out issues with account to avoid further discrepancies.     Writer called back to patient and updated with information from pharmacy. She will call then and straighten everything out.        Debi Jewell RN  [facility]

## 2023-01-12 ENCOUNTER — TELEPHONE (OUTPATIENT)
Dept: FAMILY MEDICINE | Facility: CLINIC | Age: 48
End: 2023-01-12

## 2023-01-12 NOTE — TELEPHONE ENCOUNTER
09/27/22 Sent (none) Kehr, Kristen M, [facility]     citalopram (CELEXA) 40 MG tablet 90 tablet 1 9/27/2022     [id number] - [place], [place] - [address]    TC:  Please ask the patient to call [facility] #[id number] - [place], [place] - [address] [address] and speak with a person to refill this medication as they should have refills.  Thank you. Wandy Cruz R.N.

## 2023-01-12 NOTE — TELEPHONE ENCOUNTER
Medication Question or Refill    Contacts       Type Contact Phone/Fax    01/12/2023 11:53 AM CST Phone (Incoming) Teja Wheatleyina (Self) [phone] (H)          What medication are you calling about (include dose and sig)?: progesterone (PROMETRIUM) 100 MG capsule       Controlled Substance Agreement on file:   CSA -- Patient Level:    CSA: None found at the patient level.       Who prescribed the medication?: lorntson    Do you need a refill? Yes:     When did you use the medication last? monthly    Patient offered an appointment? No    Do you have any questions or concerns?  No    Preferred Pharmacy:  [facility][id number] [place], [place] - [address]  [address] [facility] [address]  Phone: [phone] Fax: [phone]      Could we send this information to you in [facility] or would you prefer to receive a phone call?:   Patient would prefer a phone call   Okay to leave a detailed message?: Yes at Cell number on file:    Telephone Information:   Mobile [phone]

## 2023-01-12 NOTE — TELEPHONE ENCOUNTER
Medication Question or Refill    Contacts       Type Contact Phone/Fax    01/12/2023 11:55 AM CST Phone (Incoming) Janell Wheatley (Self) [phone] (H)          What medication are you calling about (include dose and sig)?: citalopram (CELEXA) 40 MG tablet       Controlled Substance Agreement on file:   CSA -- Patient Level:    CSA: None found at the patient level.       Who prescribed the medication?: kehr    Do you need a refill? Yes:     When did you use the medication last? daily    Patient offered an appointment? No    Do you have any questions or concerns?  No    Preferred Pharmacy:   [email or web address][id number] - [place], [place] - [address] [address]  [address] [facility] [address]  Phone: [phone] Fax: [phone]      Could we send this information to you in [facility] or would you prefer to receive a phone call?:   Patient would prefer a phone call   Okay to leave a detailed message?: Yes at Cell number on file:    Telephone Information:   Mobile [phone]       Connie Bryson,

## 2023-02-12 ENCOUNTER — HEALTH MAINTENANCE LETTER (OUTPATIENT)
Age: 48
End: 2023-02-12

## 2023-02-20 ENCOUNTER — TELEPHONE (OUTPATIENT)
Dept: OBGYN | Facility: CLINIC | Age: 48
End: 2023-02-20
Payer: COMMERCIAL

## 2023-02-20 DIAGNOSIS — N92.6 IRREGULAR MENSTRUAL CYCLE: ICD-10-CM

## 2023-02-20 RX ORDER — PROGESTERONE 100 MG/1
100 CAPSULE ORAL DAILY
Qty: 10 CAPSULE | Refills: 0 | Status: SHIPPED | OUTPATIENT
Start: 2023-02-20 | End: 2023-04-24

## 2023-02-20 NOTE — TELEPHONE ENCOUNTER
Called patient back and able to do as a telephone visit.  Visit changed to telephone and patient wanted to see about getting a refill on her progesterone until she see's Ana.    Lyric Santana, JASSON  February 20, 2023

## 2023-02-20 NOTE — TELEPHONE ENCOUNTER
[facility] Call Center    Phone Message    May a detailed message be left on voicemail: yes     Reason for Call: The patient called back to schedule an appointment with Ana Nelson on 3/6. She would prefer this visit to be a virtual visit if possible, otherwise she may have to reschedule. The writer did not see any appointments available with Ana for a video visit. Are you able to advise the patient on whether this can be changed to a virtual visit? Also, the patient requests the refill of progesterone now that she is scheduled that Cadence spoke with her earlier today about. Can someone please call the patient to advise? Thank you?    Action Taken: Message routed to:  [facility] p [phone]    Travel Screening: Not Applicable

## 2023-02-20 NOTE — TELEPHONE ENCOUNTER
[facility] Call Center    Phone Message    May a detailed message be left on voicemail: yes     Reason for Call: The patient is calling to request refills of the progesterone medication. She indicates that [facility] has been trying to reach the clinic for refills for the past week and hasn't heard back. Can someone please call the patient to advise? Thank you!    Action Taken: Message routed to:  [facility] p [phone]    Travel Screening: Not Applicable

## 2023-02-20 NOTE — TELEPHONE ENCOUNTER
"I do see a TE for a refill for pt's prometrium however, it never was routed to the  OB/GYN pool.    Requested Prescriptions   Pending Prescriptions Disp Refills     progesterone (PROMETRIUM) 100 MG capsule 10 capsule 2     Sig: Take 1 capsule (100 mg) by mouth daily Start on the 20th of each month and take for 10 days       Hormone Replacement Therapy Failed - 2/20/2023  9:04 AM        Failed - Blood pressure under 140/90 in past 12 months     BP Readings from Last 3 Encounters:   09/12/22 (!) 142/95   08/04/22 128/86                 Passed - Recent (12 mo) or future (30 days) visit within the authorizing provider's specialty     Patient has had an office visit with the authorizing provider or a provider within the authorizing providers department within the previous 12 mos or has a future within next 30 days. See \"Patient Info\" tab in inbasket, or \"Choose Columns\" in Meds & Orders section of the refill encounter.              Passed - Medication is active on med list        Passed - Patient is 18 years of age or older        Passed - No active pregnancy on record        Passed - No positive pregnancy test on record in past 12 months           Pt last saw LALO Damon CNP, on 9/12/22 for a physical.  Per OV notes:   \"N92.6) Irregular menstrual cycle  Comment: We discussed her previous history and medication use. Patient would like to continue on this medication regimen for the rest of this year. Declines any additional work up/intervention to help become pregnant. If it does not spontaneously occur by the end of the year, plans to return to clinic to discuss contraception.\"    Sending pt a [employer] message to remind her of this conversation and suggest she schedules an appointment with Ana to discuss contraception.    Cadence Quijano RN        "

## 2023-02-20 NOTE — TELEPHONE ENCOUNTER
Refill Request sent to Ana in separate encounter earlier today to advise on short refill. RN closing this encounter.    Darlyn James RN on 2/20/2023 at 11:03 AM

## 2023-02-28 DIAGNOSIS — N92.6 IRREGULAR MENSTRUAL CYCLE: ICD-10-CM

## 2023-02-28 RX ORDER — PROGESTERONE 100 MG/1
CAPSULE ORAL
OUTPATIENT
Start: 2023-02-28

## 2023-02-28 NOTE — TELEPHONE ENCOUNTER
This is a duplicate request. Prescription sent in on 2/20 for 10 capsules with 0 refills.    RN routing to provider to close out request.    Darlyn James RN on 2/28/2023 at 7:34 AM

## 2023-03-03 NOTE — PROGRESS NOTES
Janell is a 47 year old who is being evaluated via a billable telephone visit.      What phone number would you like to be contacted at? [phone]  How would you like to obtain your AVS? MyChart    Assessment & Plan     Birth control counseling  Reviewed contraception options-ideally prefers LARC and is debating between Depo Provera and IUD. For Depo Provera, discussed injection every 3 months, possible menstrual changes and other side effcts. Discussed clinic policy for returning on time for injection. Discussed delay with return to ovulation and need for adequate calcium, weight bearing exercise. For IUD, discussed insertion, removal, possible side effects, menstrual changes. Reviewed risk of uterine perforation with insertion and discussed possible need for surgical removal. Patient advised to take 600 mg Ibuprofen prior to insertion.   At this time, would like to discuss these options with her  and will call back in next few days with her decision. If Depo Provera is desired, will enter orders for patient to schedule a nurse appointment. If IUD is desired, patient understands need to abstain from intercourse for 2 weeks prior to IUD insertion. Would need UPT at time of appointment. Patient is given an opportunity to ask questions and have them answered.    LALO Monreal CNP  [facility]   Janell is a 47 year old, requesting a virtual visit for the following health issues:  Contraception    Telephone-Visit Details    Type of service:  Telephone Visit    Phone call Start Time: 9:33 am    Phone call End Time: 9:43am    Originating Location (pt. Location): Home    Distant Location (provider location):  [facility]    HPI     Discuss Contraception    Patient had been on a regimen of 10 days of progesterone monthly due to irregular menstrual cycles, had started this in the [facility] and provider in [place] continued it. I saw her  last fall and her plan was to continue that through the end of the year and if she was not pregnant at that time, would want to discuss contraception. Had declined any intervention to help achieve pregnancy.  Ready to discuss options at this time, would like something that is not a pill, that may provide long term contraception. Blood pressure slightly elevated at last visit. No other interim changes in her history.    Review of Systems   Constitutional, HEENT, cardiovascular, pulmonary, gi and gu systems are negative, except as otherwise noted.      Objective           Vitals:  No vitals were obtained today due to virtual visit.    Physical Exam   PSYCH: Alert and oriented times 3; coherent speech, normal   rate and volume, able to articulate logical thoughts, able   to abstract reason, no tangential thoughts, no hallucinations   or delusions   RESP: No cough, no audible wheezing, able to talk in full sentences  Remainder of exam unable to be completed due to telephone visits    Phone call duration: 10 minutes

## 2023-03-06 ENCOUNTER — VIRTUAL VISIT (OUTPATIENT)
Dept: OBGYN | Facility: CLINIC | Age: 48
End: 2023-03-06
Payer: COMMERCIAL

## 2023-03-06 DIAGNOSIS — Z30.09 BIRTH CONTROL COUNSELING: Primary | ICD-10-CM

## 2023-03-06 PROCEDURE — 99212 OFFICE O/P EST SF 10 MIN: CPT | Mod: TEL | Performed by: NURSE PRACTITIONER

## 2023-03-06 RX ORDER — PROGESTERONE 100 MG/1
100 CAPSULE ORAL DAILY
Qty: 10 CAPSULE | Refills: 0 | Status: CANCELLED | OUTPATIENT
Start: 2023-03-06

## 2023-03-06 NOTE — PATIENT INSTRUCTIONS
If you have any questions regarding your visit, Please contact your care team.     [facility]: [phone]  To Schedule an Appointment 24/7  Call: [phone] TELEPHONE NUMBER     Ana Archer- APRN CNP      Roxana Manriquez-Surgery Scheduler  Kamila-Surgery Scheduler         Monday 7:30 am-5:00 pm    Tuesday 8:00 am-4:00 pm    Wednesday 7:30 am-4:00 pm  [place]    Thursday 8:00 am-11:00 am    Friday 7:30 am-4:00 pm [address] [place] [address] [phone] ask for [facility]  [phone] Fax    Imaging Scheduling all locations  [phone]    [facility] Delivery  [address] [place] [address]   [place], [place] [id number] [phone]         Urgent Care locations:  [facility]   Monday-Friday  10 am - 8 pm  Saturday and Sunday   9 am - 5 pm     [phone]   If you need a medication refill, please contact your pharmacy. Please allow 3 business days for your refill to be completed.  As always, Thank you for trusting us with your healthcare needs!      see additional instructions from your care team below

## 2023-03-21 ENCOUNTER — TELEPHONE (OUTPATIENT)
Dept: OBGYN | Facility: CLINIC | Age: 48
End: 2023-03-21

## 2023-03-21 NOTE — TELEPHONE ENCOUNTER
[facility] Call Center    Phone Message    May a detailed message be left on voicemail: yes     Reason for Call: Pt said that she has some questions about the contraceptives that she talked to Ana about.  Please call her back to discuss.  Thanks.    Action Taken: Message routed to:  [facility] p [phone]    Travel Screening: Not Applicable

## 2023-03-21 NOTE — TELEPHONE ENCOUNTER
"Pt had a VV with LALO Damon CNP, on 3/6/23.  Per OV notes:  \"Ready to discuss options at this time, would like something that is not a pill, that may provide long term contraception. Blood pressure slightly elevated at last visit. No other interim changes in her history.\"    Spoke with pt.  Answered her questions about the IUD and Depo Provera shot.  She wanted to know if there were any medications she can take to regulate her period but not be birth control.  Confirmed with Ana that there is not a non-hormonal medication that she can take to regulate her periods that would not also prevent her from getting pregnant.    Pt will wait a few more months before deciding if she wants to be started on a hormonal medication.  I suggested to use condoms at this time if she is wanting to prevent pregnancy.    Cadence Quijano, RN    "

## 2023-04-07 ENCOUNTER — TELEPHONE (OUTPATIENT)
Dept: OBGYN | Facility: CLINIC | Age: 48
End: 2023-04-07
Payer: COMMERCIAL

## 2023-04-07 DIAGNOSIS — N92.6 IRREGULAR MENSTRUAL CYCLE: ICD-10-CM

## 2023-04-07 RX ORDER — PROGESTERONE 100 MG/1
100 CAPSULE ORAL DAILY
OUTPATIENT
Start: 2023-04-07

## 2023-04-07 NOTE — TELEPHONE ENCOUNTER
Refill denied, plan was that this was going to be discontinued, we had a telephone visit in March to discuss options and she was going to think about the choices we discussed. Has another telephone visit scheduled next week to discuss. Will address at that time. Ana MAY CNP

## 2023-04-07 NOTE — TELEPHONE ENCOUNTER
[facility] Call Center    Phone Message    May a detailed message be left on voicemail: yes     Reason for Call: Medication Refill Request      Has the patient contacted the pharmacy for the refill? Yes     Name of medication being requested:     progesterone (PROMETRIUM) 100 MG capsule    Provider who prescribed the medication: Ana Archer    Pharmacy: [facility]    Date medication is needed: ASAP

## 2023-04-11 DIAGNOSIS — N92.6 IRREGULAR MENSTRUAL CYCLE: ICD-10-CM

## 2023-04-11 RX ORDER — PROGESTERONE 100 MG/1
CAPSULE ORAL
Qty: 10 CAPSULE | Refills: 0 | OUTPATIENT
Start: 2023-04-11

## 2023-04-11 NOTE — TELEPHONE ENCOUNTER
"Requested Prescriptions   Pending Prescriptions Disp Refills     progesterone (PROMETRIUM) 100 MG capsule [Pharmacy Med Name: PROGESTERONE MICRO 100MG CAPSULES] 10 capsule 0     Sig: TAKE 1 CAPSULE(100 MG) BY MOUTH DAILY. START ON THE 20 TH OF EACH MONTH AND TAKE FOR 10 DAYS       Hormone Replacement Therapy Failed - 4/11/2023  2:35 PM        Failed - Blood pressure under 140/90 in past 12 months     BP Readings from Last 3 Encounters:   09/12/22 (!) 142/95   08/04/22 128/86                 Passed - Recent (12 mo) or future (30 days) visit within the authorizing provider's specialty     Patient has had an office visit with the authorizing provider or a provider within the authorizing providers department within the previous 12 mos or has a future within next 30 days. See \"Patient Info\" tab in inbasket, or \"Choose Columns\" in Meds & Orders section of the refill encounter.              Passed - Medication is active on med list        Passed - Patient is 18 years of age or older        Passed - No active pregnancy on record        Passed - No positive pregnancy test on record in past 12 months           Per 4/7/23 TE for a refill for Prometrium:  \"Refill denied, plan was that this was going to be discontinued, we had a telephone visit in March to discuss options and she was going to think about the choices we discussed. Has another telephone visit scheduled next week to discuss. Will address at that time. Ana MAY CNP\"    Pt is scheduled with Ana on 4/13 and this can be further discussed at that time.    Cadence Quijano RN    "

## 2023-04-12 NOTE — PROGRESS NOTES
Janell is a 47 year old who is being evaluated via a billable telephone visit.      What phone number would you like to be contacted at? [phone]  How would you like to obtain your AVS? Amanharcurtis    Assessment & Plan     Irregular menstrual cycle  Prefers to trigger a menstrual cycle before starting contraception. Will send prescription for 7 days Prometrium to pharmacy. Discussed when to expect a cycle.   - progesterone (PROMETRIUM) 200 MG capsule; Take 1 capsule (200 mg) by mouth daily    Birth control counseling  Again reviewed options with patient and she elects to plan on Depo Provera, may change to IUD in the future but for now wants to start with this. Offered to enter orders, but patient states she will call/[facility] as soon as she begins bleeding to touch base and then request the Depo Provera orders. We discussed use of Depo Provera. Discussed injection every 3 months, possible menstrual changes and other side effcts. Discussed clinic policy for returning on time for injection. Discussed delay with return to ovulation and need for adequate calcium, weight bearing exercise. Advised to send message as soon as bleeding begins, would need to schedule nurse visit within first 7 days of menstrual bleeding. If she does not follow up appropriately, will need in clinic visit for follow up.  Patient is given an opportunity to ask questions and have them answered.    LALO Monreal CNP  [facility]   Janell is a 47 year old, requesting a telephone visit for the following health issues:  irregular menstrual cycles    Telephone-Visit Details    Type of service:  Telephone Visit    Phone call Start Time: 9:45am    Phone call End Time: 9:52am    Originating Location (pt. Location): Home    Distant Location (provider location):  [facility]      HPI     Irregular menstrual cycles    Patient following up on her irregular menstrual cycles. We  discussed options for contraception last month as they had decided at this time they were no longer going to attempt pregnancy. Previously, her provider in the [facility] had started her on a regimen of oral progesterone for 10 days each month to regulate cycles. Did not want to do a daily pill and had settled on either IUD or Depo Provera, was going to make a decision and let me know how she wanted to proceed.  Scheduled appointment now as she has not had a cycle since 2/25/23, feeling bloating, breast tenderness. Wants to have a prescription to trigger a cycle and then start continuous progesterone for contraception. Right now, thinks she may do Depo Provera and see how it goes, but possibly change to IUD down the road. Wants to have her cycle first before starting Depo. Confident she is not pregnant at this time.    Review of Systems   Constitutional, HEENT, cardiovascular, pulmonary, gi and gu systems are negative, except as otherwise noted.      Objective         Vitals:  No vitals were obtained today due to virtual visit.    Physical Exam   PSYCH: Alert and oriented times 3; coherent speech, normal   rate and volume, able to articulate logical thoughts, able   to abstract reason, no tangential thoughts, no hallucinations   or delusions    RESP: No cough, no audible wheezing, able to talk in full sentences  Remainder of exam unable to be completed due to telephone visits    Phone call duration: 7 minutes

## 2023-04-13 ENCOUNTER — VIRTUAL VISIT (OUTPATIENT)
Dept: OBGYN | Facility: CLINIC | Age: 48
End: 2023-04-13
Payer: COMMERCIAL

## 2023-04-13 DIAGNOSIS — N92.6 IRREGULAR MENSTRUAL CYCLE: Primary | ICD-10-CM

## 2023-04-13 DIAGNOSIS — Z30.09 BIRTH CONTROL COUNSELING: ICD-10-CM

## 2023-04-13 PROCEDURE — 99213 OFFICE O/P EST LOW 20 MIN: CPT | Mod: 93 | Performed by: NURSE PRACTITIONER

## 2023-04-13 RX ORDER — PROGESTERONE 200 MG/1
200 CAPSULE ORAL DAILY
Qty: 7 CAPSULE | Refills: 0 | Status: SHIPPED | OUTPATIENT
Start: 2023-04-13 | End: 2023-04-24

## 2023-04-13 NOTE — PATIENT INSTRUCTIONS
If you have any questions regarding your visit, Please contact your care team.     [email or web address] Access [facility]: [phone]  To Schedule an Appointment 24/7  Call: [phone] HOU[facility] TELEPHONE NUMBER     Ana Archer- APRN CNP      Roxana Manriquez-Surgery Scheduler  Kamila-Surgery Scheduler         Monday 7:30 am-5:00 pm    Tuesday 8:00 am-4:00 pm    Wednesday 7:30 am-4:00 pm    Thursday 8:00 am-11:00 am    Friday 7:30 am-4:00 pm [address] [place] [address] [phone] ask for [facility]  [phone] Fax    Imaging Scheduling all locations  [phone]    [facility] Labor and Delivery  [address] [place] [address] Dr.  [place], [place] [id number] [phone]         Urgent Care locations:  [facility]   Monday-Friday  10 am - 8 pm  Saturday and Sunday   9 am - 5 pm     [phone]   If you need a medication refill, please contact your pharmacy. Please allow 3 business days for your refill to be completed.  As always, Thank you for trusting us with your healthcare needs!      see additional instructions from your care team below

## 2023-04-21 ENCOUNTER — MYC MEDICAL ADVICE (OUTPATIENT)
Dept: OBGYN | Facility: CLINIC | Age: 48
End: 2023-04-21
Payer: COMMERCIAL

## 2023-04-21 DIAGNOSIS — N92.6 IRREGULAR MENSTRUAL CYCLE: ICD-10-CM

## 2023-04-21 RX ORDER — PROGESTERONE 200 MG/1
CAPSULE ORAL
OUTPATIENT
Start: 2023-04-21

## 2023-04-21 NOTE — TELEPHONE ENCOUNTER
"Requested Prescriptions   Pending Prescriptions Disp Refills     progesterone (PROMETRIUM) 200 MG capsule [Pharmacy Med Name: PROGESTERONE 200MG CAPSULES] 7 capsule 0     Sig: TAKE 1 CAPSULE(200 MG) BY MOUTH DAILY       Hormone Replacement Therapy Failed - 4/21/2023  3:25 AM        Failed - Blood pressure under 140/90 in past 12 months     BP Readings from Last 3 Encounters:   09/12/22 (!) 142/95   08/04/22 128/86                 Passed - Recent (12 mo) or future (30 days) visit within the authorizing provider's specialty     Patient has had an office visit with the authorizing provider or a provider within the authorizing providers department within the previous 12 mos or has a future within next 30 days. See \"Patient Info\" tab in inbasket, or \"Choose Columns\" in Meds & Orders section of the refill encounter.              Passed - Medication is active on med list        Passed - Patient is 18 years of age or older        Passed - No active pregnancy on record        Passed - No positive pregnancy test on record in past 12 months             Pt last seen 4/13/2023 for VV for BC counseling    Last prescribed 4/13/2023 for 7 capsules with 0 refills    RN sent mychart to pt to confirm if this was an automated request.    Darlyn James RN on 4/21/2023 at 8:21 AM    "

## 2023-04-24 ENCOUNTER — ALLIED HEALTH/NURSE VISIT (OUTPATIENT)
Dept: NURSING | Facility: CLINIC | Age: 48
End: 2023-04-24
Payer: COMMERCIAL

## 2023-04-24 DIAGNOSIS — Z32.00 PREGNANCY EXAMINATION OR TEST, PREGNANCY UNCONFIRMED: Primary | ICD-10-CM

## 2023-04-24 DIAGNOSIS — Z30.42 ENCOUNTER FOR SURVEILLANCE OF INJECTABLE CONTRACEPTIVE: ICD-10-CM

## 2023-04-24 LAB
HCG UR QL: NEGATIVE
INTERNAL QC OK POCT: NORMAL
POCT KIT EXPIRATION DATE: NORMAL
POCT KIT LOT NUMBER: NORMAL

## 2023-04-24 PROCEDURE — 96372 THER/PROPH/DIAG INJ SC/IM: CPT | Performed by: NURSE PRACTITIONER

## 2023-04-24 PROCEDURE — 81025 URINE PREGNANCY TEST: CPT

## 2023-04-24 PROCEDURE — 99207 PR NO CHARGE NURSE ONLY: CPT

## 2023-04-24 RX ADMIN — MEDROXYPROGESTERONE ACETATE 150 MG: 150 INJECTION, SUSPENSION INTRAMUSCULAR at 14:13

## 2023-04-24 NOTE — PROGRESS NOTES
Clinic Administered Medication Documentation      Depo Provera Documentation    Depo-Provera Standing Order inclusion/exclusion criteria reviewed.    Is this the initial or subsequent dose of Depo Provera? Initial dose - patient is not within the first 7 days of onset of normal menstrual period. Pregnancy test is indicated. Pregnancy test result: negative     Patient meets: inclusion criteria     Is there an active order (written within the past 365 days, with administrations remaining, not ) in the chart? Yes.     Prior to injection, verified patient identity using patient's name and date of birth. Medication was administered. Please see MAR and medication order for additional information.     Vial/Syringe: Single dose vial. Was entire vial of medication used? Yes    Patient instructed to report any adverse reaction to staff immediately.  NEXT INJECTION DUE: 7/10/23 - 23    Verified that the patient has refills remaining in their prescription.    Kaylie Manzo MA

## 2023-05-12 DIAGNOSIS — F41.1 GAD (GENERALIZED ANXIETY DISORDER): ICD-10-CM

## 2023-05-12 RX ORDER — CITALOPRAM HYDROBROMIDE 40 MG/1
TABLET ORAL
Qty: 90 TABLET | Refills: 1 | Status: SHIPPED | OUTPATIENT
Start: 2023-05-12 | End: 2023-07-10

## 2023-07-10 ENCOUNTER — TELEPHONE (OUTPATIENT)
Dept: OBGYN | Facility: CLINIC | Age: 48
End: 2023-07-10
Payer: COMMERCIAL

## 2023-07-10 DIAGNOSIS — F41.1 GAD (GENERALIZED ANXIETY DISORDER): ICD-10-CM

## 2023-07-10 RX ORDER — CITALOPRAM HYDROBROMIDE 40 MG/1
40 TABLET ORAL DAILY
Qty: 90 TABLET | Refills: 0 | Status: SHIPPED | OUTPATIENT
Start: 2023-07-10 | End: 2023-10-02

## 2023-07-10 NOTE — TELEPHONE ENCOUNTER
[facility] Call Center    Phone Message    May a detailed message be left on voicemail: no     Reason for Call: Medication Refill Request    Has the patient contacted the pharmacy for the refill? No.     pt is leaving the country for 6 [place] and is wanting a double prescription of celexa prescripted by Kristen Kehr to be sent to [facility] in [place].         Action Taken: Message routed to:  [facility] NAOMY    Travel Screening: Not Applicable

## 2023-07-10 NOTE — TELEPHONE ENCOUNTER
A 90 day prescription was sent to the pharmacy.  This should be plenty for upcoming travel.   Thank you.   Kristen Kehr PA-C

## 2023-09-05 DIAGNOSIS — F41.1 GAD (GENERALIZED ANXIETY DISORDER): ICD-10-CM

## 2023-09-06 RX ORDER — CITALOPRAM HYDROBROMIDE 20 MG/1
TABLET ORAL
Qty: 30 TABLET | Refills: 1 | OUTPATIENT
Start: 2023-09-06

## 2023-09-22 ENCOUNTER — MYC MEDICAL ADVICE (OUTPATIENT)
Dept: OBGYN | Facility: CLINIC | Age: 48
End: 2023-09-22
Payer: COMMERCIAL

## 2023-09-22 NOTE — TELEPHONE ENCOUNTER
"Last depo provera injection: 4/24/2023  NEXT INJECTION DUE: 7/10/23 - 8/7/23     VV 4/13/2023 copy/pasted: \"Depo Provera. Discussed injection every 3 months\"  "

## 2023-09-25 ENCOUNTER — ALLIED HEALTH/NURSE VISIT (OUTPATIENT)
Dept: OBGYN | Facility: CLINIC | Age: 48
End: 2023-09-25
Payer: COMMERCIAL

## 2023-09-25 DIAGNOSIS — Z23 ENCOUNTER FOR IMMUNIZATION: Primary | ICD-10-CM

## 2023-09-25 PROCEDURE — 99207 PR NO CHARGE NURSE ONLY: CPT

## 2023-09-25 PROCEDURE — 96372 THER/PROPH/DIAG INJ SC/IM: CPT | Performed by: NURSE PRACTITIONER

## 2023-09-25 RX ADMIN — MEDROXYPROGESTERONE ACETATE 150 MG: 150 INJECTION, SUSPENSION INTRAMUSCULAR at 09:38

## 2023-09-25 NOTE — PROGRESS NOTES
Clinic Administered Medication Documentation      Depo Provera Documentation    Depo-Provera Standing Order inclusion/exclusion criteria reviewed.     Is this the initial or subsequent dose of Depo Provera? Subsequent dose - patient is within the acceptable window of time (11-15 weeks) for subsequent injection. Pregnancy test not indicated.    Patient meets: inclusion criteria     Is there an active order (written within the past 365 days, with administrations remaining, not ) in the chart? Yes.     Prior to injection, verified patient identity using patient's name and date of birth. Medication was administered. Please see MAR and medication order for additional information.     Vial/Syringe: Single dose vial. Was entire vial of medication used? Yes    Patient instructed to remain in clinic for 15 minutes and report any adverse reaction to staff immediately.  NEXT INJECTION DUE: 23 - 24    Verified that the patient has refills remaining in their prescription.    Rakel Chapman, [facility] 2023 9:40 AM

## 2023-10-02 DIAGNOSIS — F41.1 GAD (GENERALIZED ANXIETY DISORDER): ICD-10-CM

## 2023-10-02 RX ORDER — CITALOPRAM HYDROBROMIDE 40 MG/1
40 TABLET ORAL DAILY
Qty: 30 TABLET | Refills: 0 | Status: SHIPPED | OUTPATIENT
Start: 2023-10-02 | End: 2023-12-28

## 2023-10-02 RX ORDER — CITALOPRAM HYDROBROMIDE 20 MG/1
30 TABLET ORAL DAILY
Qty: 135 TABLET | Refills: 1 | OUTPATIENT
Start: 2023-10-02

## 2023-10-02 NOTE — TELEPHONE ENCOUNTER
The last prescription sent was for citalopram 40 mg.   This is not the correct refill.     30 days of the citalopram 40 mg sent so that she is not out of the medication   She has an appointment 10/19.     Kristen Kehr PA-C

## 2023-10-12 ENCOUNTER — OFFICE VISIT (OUTPATIENT)
Dept: URGENT CARE | Facility: URGENT CARE | Age: 48
End: 2023-10-12
Payer: COMMERCIAL

## 2023-10-12 VITALS
DIASTOLIC BLOOD PRESSURE: 92 MMHG | TEMPERATURE: 97.8 F | BODY MASS INDEX: 27.03 KG/M2 | OXYGEN SATURATION: 98 % | HEART RATE: 82 BPM | SYSTOLIC BLOOD PRESSURE: 146 MMHG | WEIGHT: 170 LBS | RESPIRATION RATE: 18 BRPM

## 2023-10-12 DIAGNOSIS — T81.40XA POSTOPERATIVE INFECTION, UNSPECIFIED TYPE, INITIAL ENCOUNTER: ICD-10-CM

## 2023-10-12 DIAGNOSIS — L02.01 ABSCESS OF CHEEK: Primary | ICD-10-CM

## 2023-10-12 PROCEDURE — 99214 OFFICE O/P EST MOD 30 MIN: CPT | Performed by: NURSE PRACTITIONER

## 2023-10-12 RX ORDER — CLINDAMYCIN HCL 300 MG
CAPSULE ORAL
COMMUNITY
Start: 2023-10-09 | End: 2024-02-05

## 2023-10-12 RX ORDER — AMOXICILLIN AND CLAVULANATE POTASSIUM 500; 125 MG/1; MG/1
TABLET, FILM COATED ORAL
COMMUNITY
Start: 2023-10-09 | End: 2024-02-05

## 2023-10-12 NOTE — PROGRESS NOTES
Assessment & Plan     Abscess of cheek       Recommend further evaluation in emergency room for post-op infection with abscesses, swelling, erythema with 2 oral abx for 3 weeks not helping as aspiration indicated, possible surgical intervention. Patient agreeable and declines ambulance. She is discharged in stable condition.         Reba Manuel NP  [facility]    Romeo Maciel is a 47 year old female who presents to clinic today for the following health issues:  Chief Complaint   Patient presents with    Urgent Care    Derm Problem     Per patient was told to [facility] to  to have infection on left cheek aspirated      Patient presents for evaluation of left cheek swelling. She had liposuction 8/17 in the [facility]. She went to the sauna a couple times and developed swelling and redness in left cheek. Unsure if symptoms have been worsening. Denies pain, fever, drainage. She has been taking Amoxicillin and clindamycin for 3 weeks which hasn't been helping. She called surgeon who recommended she have left cheek aspirated.      Problem list, Medication list, Allergies, and Medical history reviewed in EPIC.    ROS:  Review of systems negative except for noted above        Objective    BP (!) 146/92   Pulse 82   Temp 97.8  F (36.6  C) (Tympanic)   Resp 18   Wt 77.1 kg (170 lb)   SpO2 98%   BMI 27.03 kg/m    Physical Exam  Constitutional:       General: She is not in acute distress.     Appearance: She is not toxic-appearing or diaphoretic.   HENT:      Head:      Comments: Severe swelling left cheek with two erythematous abscesses approx 1.5 cm without drainage with increased warmth  Skin:     Findings: Erythema present.   Neurological:      Mental Status: She is alert.

## 2023-10-12 NOTE — PATIENT INSTRUCTIONS
Go to emergency room for further evaluation of left cheek infection after lipo suction. Already on abx for 3 weeks

## 2023-10-17 ENCOUNTER — APPOINTMENT (OUTPATIENT)
Dept: URBAN - METROPOLITAN AREA CLINIC 252 | Age: 48
Setting detail: DERMATOLOGY
End: 2023-10-17

## 2023-10-17 VITALS — WEIGHT: 170 LBS | HEIGHT: 68 IN

## 2023-10-17 DIAGNOSIS — L0390 CELLULITIS AND ABSCESS OF UNSPECIFIED SITES: ICD-10-CM

## 2023-10-17 DIAGNOSIS — L0391 CELLULITIS AND ABSCESS OF UNSPECIFIED SITES: ICD-10-CM

## 2023-10-17 PROBLEM — L02.91 CUTANEOUS ABSCESS, UNSPECIFIED: Status: ACTIVE | Noted: 2023-10-17

## 2023-10-17 PROCEDURE — 99203 OFFICE O/P NEW LOW 30 MIN: CPT

## 2023-10-17 PROCEDURE — OTHER COUNSELING: OTHER

## 2023-10-17 PROCEDURE — OTHER ORDER TESTS: OTHER

## 2023-10-17 PROCEDURE — OTHER PRESCRIPTION: OTHER

## 2023-10-17 RX ORDER — SULFAMETHOXAZOLE AND TRIMETHOPRIM 800; 160 MG/1; MG/1
TABLET ORAL BID
Qty: 20 | Refills: 0 | Status: ERX | COMMUNITY
Start: 2023-10-17

## 2023-10-17 NOTE — PROCEDURE: ORDER TESTS
Lab Facility: 0
Performing Laboratory: -[id number]
Bill For Surgical Tray: no
Billing Type: Third-Party Bill
Expected Date Of Service: 10/17/2023

## 2023-10-17 NOTE — PROCEDURE: COUNSELING
Detail Level: Detailed
Patient Specific Counseling (Will Not Stick From Patient To Patient): - Discontinue current use of oral Amoxicillin and oral Clindamycin due to weeks of use and no improvement.\\n- Recommended an alternative oral antibiotic; Bactrim DS.\\n- Continue taking a probiotic with use of oral antibiotic, increasing to three times a day.\\n- with patient’s informed conset performed very conservative incision and drainage making a 2mm incision to drain puss. Discussed wound care in detail. \\n- Culture done today.\\n- Recommended warm compresses to affected area to help lesions drain.

## 2023-10-17 NOTE — HPI: RASH
Is This A New Presentation, Or A Follow-Up?: Rash
Additional History: She had liposuction. There are tree spots and one drained. Had liposuction on face August 18. Had lipo 5 times before but never on face until now. Used a sauna after 1 month and this aggrivated it. Also has an inflamed lesion on axilla as well. Using clindamycin tid for 3 weeks and amoxicillin 500mg bid for 7 days. Restarted both of these yesterday. Taking a probiotic once per day. Had bactrim for urinary tract infection in past without side effects.

## 2023-10-20 ENCOUNTER — APPOINTMENT (OUTPATIENT)
Dept: URBAN - METROPOLITAN AREA CLINIC 252 | Age: 48
Setting detail: DERMATOLOGY
End: 2023-10-20

## 2023-10-20 VITALS — HEIGHT: 55 IN | WEIGHT: 170 LBS

## 2023-10-20 VITALS — WEIGHT: 170 LBS | HEIGHT: 55 IN | RESPIRATION RATE: 16 BRPM

## 2023-10-20 DIAGNOSIS — L0390 CELLULITIS AND ABSCESS OF UNSPECIFIED SITES: ICD-10-CM

## 2023-10-20 DIAGNOSIS — L0391 CELLULITIS AND ABSCESS OF UNSPECIFIED SITES: ICD-10-CM

## 2023-10-20 PROBLEM — L02.01 CUTANEOUS ABSCESS OF FACE: Status: ACTIVE | Noted: 2023-10-20

## 2023-10-20 PROCEDURE — OTHER MIPS QUALITY: OTHER

## 2023-10-20 PROCEDURE — OTHER COUNSELING: OTHER

## 2023-10-20 PROCEDURE — 10061 I&D ABSCESS COMP/MULTIPLE: CPT

## 2023-10-20 PROCEDURE — OTHER INCISION AND DRAINAGE: OTHER

## 2023-10-20 ASSESSMENT — LOCATION ZONE DERM: LOCATION ZONE: FACE

## 2023-10-20 ASSESSMENT — LOCATION DETAILED DESCRIPTION DERM: LOCATION DETAILED: LEFT LATERAL BUCCAL CHEEK

## 2023-10-20 ASSESSMENT — LOCATION SIMPLE DESCRIPTION DERM: LOCATION SIMPLE: LEFT CHEEK

## 2023-10-20 NOTE — HPI: SKIN LESION
Is This A New Presentation, Or A Follow-Up?: Skin Lesion
Additional History: Small incision and drainage performed Tuesday and it closed yesterday and started growing,

## 2023-10-20 NOTE — PROCEDURE: INCISION AND DRAINAGE
Detail Level: Detailed
Lesion Type: Abscess
I&D Type: complicated
Method: lancet
Curette: No
Anesthesia Type: 1% lidocaine with epinephrine
Anesthesia Volume In Cc: 3
Packing?: iodoform packing strips
Size Of Lesion In Cm (Optional But May Be Required For Some Insurances): 0
Drainage Type?: purulent
Dressing: dry sterile dressing
Curette Text (Optional): After the contents were expressed a curette was used to partially remove the cyst wall.
Epidermal Sutures: 4-0 Ethilon
Epidermal Closure: simple interrupted
Suture Text: The incision was partially closed with
Consent was obtained and risks were reviewed including but not limited to delayed wound healing, infection, need for multiple I and D's, and pain.
Post-Care Instructions: I reviewed with the patient in detail post-care instructions. Patient should keep wound covered and call the office should any redness, pain, swelling or worsening occur.

## 2023-10-20 NOTE — PROCEDURE: COUNSELING
Detail Level: Detailed
Patient Specific Counseling (Will Not Stick From Patient To Patient): The patient return to clinic due to abscess worsening, growing and painful since her recent appointment that was 2 days ago. Discussed treatment option of I&D again and pack with Iodoform for 1 week. Patient to continue oral antibiotic of Bactrim DS as directed. Patient agreed to the treatment plan. Instructed patient to pull out the idoform 1/2 inch at a time in 3 days. Recommend a follow up in 6 days for recheck.

## 2023-10-22 ENCOUNTER — HEALTH MAINTENANCE LETTER (OUTPATIENT)
Age: 48
End: 2023-10-22

## 2023-10-26 ENCOUNTER — APPOINTMENT (OUTPATIENT)
Dept: URBAN - METROPOLITAN AREA CLINIC 252 | Age: 48
Setting detail: DERMATOLOGY
End: 2023-10-26

## 2023-10-26 DIAGNOSIS — L0391 CELLULITIS AND ABSCESS OF UNSPECIFIED SITES: ICD-10-CM

## 2023-10-26 DIAGNOSIS — L0390 CELLULITIS AND ABSCESS OF UNSPECIFIED SITES: ICD-10-CM

## 2023-10-26 PROBLEM — L02.01 CUTANEOUS ABSCESS OF FACE: Status: ACTIVE | Noted: 2023-10-26

## 2023-10-26 PROCEDURE — OTHER ORDER TESTS: OTHER

## 2023-10-26 PROCEDURE — OTHER PRESCRIPTION: OTHER

## 2023-10-26 PROCEDURE — OTHER COUNSELING: OTHER

## 2023-10-26 PROCEDURE — 99213 OFFICE O/P EST LOW 20 MIN: CPT | Mod: 24

## 2023-10-26 RX ORDER — TERBINAFINE HYDROCHLORIDE 250 MG/1
TABLET ORAL
Qty: 21 | Refills: 0 | Status: ERX | COMMUNITY
Start: 2023-10-26

## 2023-10-26 ASSESSMENT — LOCATION ZONE DERM: LOCATION ZONE: FACE

## 2023-10-26 ASSESSMENT — LOCATION DETAILED DESCRIPTION DERM: LOCATION DETAILED: LEFT SUPERIOR LATERAL BUCCAL CHEEK

## 2023-10-26 ASSESSMENT — LOCATION SIMPLE DESCRIPTION DERM: LOCATION SIMPLE: LEFT CHEEK

## 2023-10-26 NOTE — PROCEDURE: COUNSELING
Detail Level: Detailed
Patient Specific Counseling (Will Not Stick From Patient To Patient): - Patient to continue Bactrim treatment as she only has 2 pill left.\\n- Took another bacterial culture, due to a neg result. Will await those results.\\n- Patient to start an oral anti-fungal Terbinafine

## 2023-10-26 NOTE — HPI: SKIN LESIONS
Is This A New Presentation, Or A Follow-Up?: Skin Lesions
Additional History: Second incision and drainage performed a few days ago. This is getting worse. Has 2 more pills left of the bactrim.   Denies dental pain and fevers. The site that was drained is nontender and imrpoving. Still has opening from where packed. She removed the iodoform gauze completely Tuesday. Lymph nodes more swollen. Culture was negative.

## 2023-10-26 NOTE — PROCEDURE: ORDER TESTS
Lab Facility: 0
Performing Laboratory: [id number]
Bill For Surgical Tray: no
Expected Date Of Service: 10/26/2023
Billing Type: Third-Party Bill

## 2023-10-31 ENCOUNTER — RX ONLY (RX ONLY)
Age: 48
End: 2023-10-31

## 2023-10-31 RX ORDER — ITRACONAZOLE 100 MG/1
CAPSULE ORAL
Qty: 42 | Refills: 0 | Status: ERX | COMMUNITY
Start: 2023-10-31

## 2023-11-02 ENCOUNTER — APPOINTMENT (OUTPATIENT)
Dept: URBAN - METROPOLITAN AREA CLINIC 252 | Age: 48
Setting detail: DERMATOLOGY
End: 2023-11-02

## 2023-11-02 DIAGNOSIS — L0390 CELLULITIS AND ABSCESS OF UNSPECIFIED SITES: ICD-10-CM

## 2023-11-02 DIAGNOSIS — L0391 CELLULITIS AND ABSCESS OF UNSPECIFIED SITES: ICD-10-CM

## 2023-11-02 PROBLEM — L02.01 CUTANEOUS ABSCESS OF FACE: Status: ACTIVE | Noted: 2023-11-02

## 2023-11-02 PROCEDURE — OTHER COUNSELING: OTHER

## 2023-11-02 PROCEDURE — 99212 OFFICE O/P EST SF 10 MIN: CPT

## 2023-11-02 ASSESSMENT — LOCATION DETAILED DESCRIPTION DERM
LOCATION DETAILED: LEFT CENTRAL BUCCAL CHEEK
LOCATION DETAILED: LEFT LATERAL BUCCAL CHEEK

## 2023-11-02 ASSESSMENT — LOCATION ZONE DERM: LOCATION ZONE: FACE

## 2023-11-02 ASSESSMENT — LOCATION SIMPLE DESCRIPTION DERM: LOCATION SIMPLE: LEFT CHEEK

## 2023-11-02 NOTE — HPI: SKIN LESIONS
Is This A New Presentation, Or A Follow-Up?: Growths
Additional History: We repeated bacterial culture aerobic and anaerobic culture at last office visit and showed no growth.  We started terbinafine that was causing bad headache so she stopped after 3 day and we switched to itraconazole. She finished her bactrim prescription. The one that drained is continuing to improve. A new spot has started. She got some drainage out of one. She did not yet fill the prescription for the itraconazole. Has an appointment with a plastic surgeon on Next office visit 21, 2023. She had this liposuction done in [facility].

## 2023-11-02 NOTE — PROCEDURE: COUNSELING
Patient Specific Counseling (Will Not Stick From Patient To Patient): - Patient to start itraconazole today. Labs came back negative for aerobic and anaerobic bacteria. \\n- Patient to keep appointment with plastic surgeon \\n- Will consider ilk to treat as sterile abscess if itraconazole does not start working within a week.
Detail Level: Detailed

## 2023-11-05 DIAGNOSIS — F41.1 GAD (GENERALIZED ANXIETY DISORDER): ICD-10-CM

## 2023-11-06 RX ORDER — CITALOPRAM HYDROBROMIDE 40 MG/1
40 TABLET ORAL DAILY
Qty: 30 TABLET | Refills: 0 | OUTPATIENT
Start: 2023-11-06

## 2023-11-13 ENCOUNTER — APPOINTMENT (OUTPATIENT)
Dept: URBAN - METROPOLITAN AREA CLINIC 252 | Age: 48
Setting detail: DERMATOLOGY
End: 2023-11-13

## 2023-11-13 DIAGNOSIS — L0390 CELLULITIS AND ABSCESS OF UNSPECIFIED SITES: ICD-10-CM

## 2023-11-13 DIAGNOSIS — T88.8XX: ICD-10-CM

## 2023-11-13 DIAGNOSIS — L0391 CELLULITIS AND ABSCESS OF UNSPECIFIED SITES: ICD-10-CM

## 2023-11-13 PROBLEM — L02.01 CUTANEOUS ABSCESS OF FACE: Status: ACTIVE | Noted: 2023-11-13

## 2023-11-13 PROBLEM — T88.8XXA OTHER SPECIFIED COMPLICATIONS OF SURGICAL AND MEDICAL CARE, NOT ELSEWHERE CLASSIFIED, INITIAL ENCOUNTER: Status: ACTIVE | Noted: 2023-11-13

## 2023-11-13 PROBLEM — L02.11 CUTANEOUS ABSCESS OF NECK: Status: ACTIVE | Noted: 2023-11-13

## 2023-11-13 PROCEDURE — 10160 PNXR ASPIR ABSC HMTMA BULLA: CPT

## 2023-11-13 PROCEDURE — OTHER COUNSELING: OTHER

## 2023-11-13 PROCEDURE — 99212 OFFICE O/P EST SF 10 MIN: CPT | Mod: 25

## 2023-11-13 PROCEDURE — OTHER ORDER TESTS: OTHER

## 2023-11-13 PROCEDURE — OTHER PUNCTURE ASPIRATION: OTHER

## 2023-11-13 ASSESSMENT — LOCATION ZONE DERM
LOCATION ZONE: FACE
LOCATION ZONE: NECK

## 2023-11-13 ASSESSMENT — LOCATION DETAILED DESCRIPTION DERM
LOCATION DETAILED: LEFT INFERIOR LATERAL BUCCAL CHEEK
LOCATION DETAILED: LEFT INFERIOR ANTERIOR NECK
LOCATION DETAILED: LEFT CENTRAL BUCCAL CHEEK
LOCATION DETAILED: RIGHT INFERIOR ANTERIOR NECK

## 2023-11-13 ASSESSMENT — LOCATION SIMPLE DESCRIPTION DERM
LOCATION SIMPLE: LEFT ANTERIOR NECK
LOCATION SIMPLE: RIGHT ANTERIOR NECK
LOCATION SIMPLE: LEFT CHEEK

## 2023-11-13 NOTE — HPI: SKIN LESIONS
Is This A New Presentation, Or A Follow-Up?: Growths
Additional History: Patient has been off antifungals for almost just under 2 weeks (had taken a single dose on day of last office visit.  She was started on cipro 500mg bid and dicloxacillin 500mg qid by [facility] doctor and started 8 days ago (of a 14 days course) after having a virtual appt.  She showed up earlier than our 2 week angelique.   Has an appointment for us with our plastics.  The abscesses are improving bit the neck swelling is tender. She has not restarted.

## 2023-11-13 NOTE — PROCEDURE: PUNCTURE ASPIRATION
Detail Level: Detailed
Puncture Method: 18G needle
Drainage Amount In Cc (Will Not Render If Left At 0): 7
Drainage Type?: purulent
Anesthesia Type: 2% lidocaine with epinephrine
Anesthesia Volume In Cc: 1.7
Size Of Lesion In Cm (Optional But May Be Required For Some Insurances): 0
Wound Care: Aquaphor
Dressing: Band-Aid
Preparation Text: The area was prepped in the usual clean fashion.
Consent was obtained and risks were reviewed including but not limited to delayed wound healing, infection, need for multiple procedures to completely drain the lesion, and pain.
Render Postcare In Note?: No
Post-Care Instructions: I reviewed with the patient in detail post-care instructions. Patient should keep wound covered and call the office should any redness, pain, swelling or worsening occur.
Drainage Amount In Cc (Will Not Render If Left At 0): 0.5
Anesthesia Volume In Cc: 0.4

## 2023-11-13 NOTE — PROCEDURE: COUNSELING
Patient Specific Counseling (Will Not Stick From Patient To Patient): - Sharing this case with Dr. Medel, See intramail for reference. \\n- 3 different specimens were taken today of the fluid in the neck by aspiration. Will await the results of those special tests. \\n- At this time patient will finish her antibiotics prescribed by her original plastic surgeon in the [facility], unless otherwise directed. \\n- Patient is to keep her appointment on the 21st of this month with her new plastic surgeon here in the . \\n- We did not perform the biopsy today, patient will come back on Thursday to have this done, as she needed to be off of any anti-fungal medication for at least 12 days.
Detail Level: Detailed
Patient Specific Counseling (Will Not Stick From Patient To Patient): Discussed this seroma is related to her abscess but may be a different type of skin problem but likely still infected based off of puss. That was drained via aspiration.

## 2023-11-13 NOTE — PROCEDURE: ORDER TESTS
Lab Facility: 0
Bill For Surgical Tray: no
Performing Laboratory: [id number]
Billing Type: Third-Party Bill
Expected Date Of Service: 11/13/2023

## 2023-11-16 ENCOUNTER — APPOINTMENT (OUTPATIENT)
Dept: URBAN - METROPOLITAN AREA CLINIC 252 | Age: 48
Setting detail: DERMATOLOGY
End: 2023-11-17

## 2023-11-16 DIAGNOSIS — L0390 CELLULITIS AND ABSCESS OF UNSPECIFIED SITES: ICD-10-CM

## 2023-11-16 DIAGNOSIS — L0391 CELLULITIS AND ABSCESS OF UNSPECIFIED SITES: ICD-10-CM

## 2023-11-16 PROBLEM — L02.01 CUTANEOUS ABSCESS OF FACE: Status: ACTIVE | Noted: 2023-11-16

## 2023-11-16 PROCEDURE — OTHER COUNSELING: OTHER

## 2023-11-16 PROCEDURE — OTHER BIOPSY BY PUNCH METHOD: OTHER

## 2023-11-16 PROCEDURE — OTHER ORDER TESTS: OTHER

## 2023-11-16 PROCEDURE — 11104 PUNCH BX SKIN SINGLE LESION: CPT | Mod: 79

## 2023-11-16 ASSESSMENT — LOCATION ZONE DERM: LOCATION ZONE: FACE

## 2023-11-16 ASSESSMENT — LOCATION SIMPLE DESCRIPTION DERM: LOCATION SIMPLE: LEFT CHEEK

## 2023-11-16 ASSESSMENT — LOCATION DETAILED DESCRIPTION DERM: LOCATION DETAILED: LEFT LATERAL BUCCAL CHEEK

## 2023-11-16 NOTE — PROCEDURE: ORDER TESTS
Lab Facility: 0
Bill For Surgical Tray: no
Billing Type: Third-Party Bill
Expected Date Of Service: 11/16/2023
Performing Laboratory: [id number]

## 2023-11-16 NOTE — PROCEDURE: BIOPSY BY PUNCH METHOD
Detail Level: Detailed
Was A Bandage Applied: Yes
Punch Size In Mm: 3
Size Of Lesion In Cm (Optional): 0
Depth Of Punch Biopsy: dermis
Biopsy Type: H and E
Anesthesia Type: 2% lidocaine with epinephrine
Anesthesia Volume In Cc (Will Not Render If 0): 0.5
Hemostasis: None
Epidermal Sutures: none, closed by secondary intention
Wound Care: Aquaphor
Dressing: bandage
Patient Will Remove Sutures At Home?: No
Lab: -[id number]
Path Notes Override (Will Replace All Of The Above Text): Specimen was a 3 mm punch dissected tissue
Consent: - Verbal and written consent was obtained and risks were reviewed prior to procedure today. \\n- Risks discussed include but are not limited to scarring, infection, bleeding, scabbing, nerve damage, pain, and allergy to anesthesia.
Post-Care Instructions: WOUND CARE:\\nDo NOT submerge wound in a bath, swimming pool, or hot tub for at least 1 week or for as long as there is an open wound. Gently cleanse the site daily with mild soap and water. Be careful NOT to allow a forceful stream of water to hit the biopsy site. After cleaning/showering, apply a thin layer of petrolatum ointment or Aquaphor in the wound followed by an adhesive bandage. Continue this process daily until healed. \\n\\nBLEEDING:\\nIf you develop persistent bleeding, apply firm and steady pressure over the dressing with gauze for 15 minutes. If bleeding persists, reapply pressure with an ice pack over the gauze for 15 minutes. NEVER APPLY ICE DIRECTLY TO THE SKIN. Do NOT peek under the gauze during these 15 minutes of pressure.  Call our office at [phone] if you cannot get the bleeding to stop. \\n\\nINFECTION:\\nSigns of infection may include increasing rather than decreasing pain (after the first few days), increasing redness/swelling/heat, draining pus, pink/red streaks around the wound, and fever or chills.  Please call our office immediately at [phone] if infection is suspected. It is normal to have some mild redness on or around the wound edges; this will lighten day by day and will become less tender.\\n\\nPAIN:\\nPain is usually minimal, but if needed you may take acetaminophen (Tylenol) every four hours as needed. Applying an ice pack over the dressing for 15-20 minutes every 2-3 hours will relieve swelling, lessen pain, and help minimize bruising. NEVER APPLY ICE DIRECTLY TO THE SKIN. Avoid ibuprofen (Advil, Motrin) and naproxen (Aleve) for the first 48 hours as these can increase bleeding.\\n\\nSWELLING AND BRUISING:\\nSwelling and bruising are common and temporary, usually lasting 1 - 2 weeks. It is more common in areas treated around the eyes, hands, and feet. In the days following the procedure, swelling and bruising can be minimized by keeping the affected areas elevated when possible, reducing salty foods, and applying ice packs over the dressing for 15-20 minutes every 2-3 hours. NEVER APPLY ICE DIRECTLY TO THE SKIN.\\n\\nITCHING:\\nItchiness on and around the wound is very common and can last several days to weeks after surgery. Mild itch is normal as the wound is healing. \\n\\nNERVE CHANGES:\\nNumbness is usually temporary, but it may last for several weeks to months. You may also experience sharp pains at the wound sight as it heals.  Mild pain is normal and will gradually improve with time.\\n \\nNO SMOKING:\\nSmoking will delay the healing process. If you smoke, we recommend trying to quit or at minimum reduce how much you smoke following a procedure.
Home Suture Removal Text: - Patient prefers to remove sutures themselves at home or have a friend/family member who s experienced at suture removal remove the sutures within the specified recommended removal time. \\n- Discussed the appropriate suture removal technique and the importance of cleansing the area with rubbing alcohol before removal and using clean/sterile tools to minimize risk of infection.\\n- Patient was provided a sterile factory-wrapped 11-blade to use to remove their sutures at home.  A set of clear forceps will also be necessary. \\n- If they have any questions or difficulties, call the office to set up removal in clinic with medical support staff.\\n- Patient expressed understanding.
Notification Instructions: Patient will be notified of biopsy results. However, patient instructed to call the office if not contacted within 2 weeks.
Billing Type: Third-Party Bill
Information: Selecting Yes will display possible errors in your note based on the variables you have selected. This validation is only offered as a suggestion for you. PLEASE NOTE THAT THE VALIDATION TEXT WILL BE REMOVED WHEN YOU FINALIZE YOUR NOTE. IF YOU WANT TO FAX A PRELIMINARY NOTE YOU WILL NEED TO TOGGLE THIS TO 'NO' IF YOU DO NOT WANT IT IN YOUR FAXED NOTE.

## 2023-11-16 NOTE — HPI: SKIN LESIONS
Is This A New Presentation, Or A Follow-Up?: Skin Lesions
Additional History: Patient here for tissue biopsy and tissue fungal culture. She stopped the antibiotics at last office visit that were prescribed elsewhere. Last and only dose of itraconazole was 2 weeks ago. We will plan to do a tissue mycobacterial bacteria culture 2 weeks from last Tuesday at the earliest.  Tissue for fungal culture well be sent an preservative free wetted saline gauze in a sterile urine cup. Also using preservative free lidocaine/epi.

## 2023-11-16 NOTE — PROCEDURE: COUNSELING
Patient Specific Counseling (Will Not Stick From Patient To Patient): Discussed punch biopsy and tissue fungal culture. Since recent antibiotic use will hold off for 2 weeks from last Tuesday for tissue mycobacterial culture. Discussed expectation of scarring. Call if any sudden worsening. With regard to her seroma/abscess she is requestung additional aspiration, however upon attemp a small amount of blood was aspirated but no puss today. Verbal and written consent obtained today prior to procedure today.
Detail Level: Detailed

## 2023-11-21 ENCOUNTER — APPOINTMENT (OUTPATIENT)
Dept: URBAN - METROPOLITAN AREA CLINIC 252 | Age: 48
Setting detail: DERMATOLOGY
End: 2023-11-22

## 2023-11-21 DIAGNOSIS — L0390 CELLULITIS AND ABSCESS OF UNSPECIFIED SITES: ICD-10-CM

## 2023-11-21 DIAGNOSIS — L0391 CELLULITIS AND ABSCESS OF UNSPECIFIED SITES: ICD-10-CM

## 2023-11-21 PROBLEM — L02.11 CUTANEOUS ABSCESS OF NECK: Status: ACTIVE | Noted: 2023-11-21

## 2023-11-21 PROBLEM — L02.01 CUTANEOUS ABSCESS OF FACE: Status: ACTIVE | Noted: 2023-11-21

## 2023-11-21 PROCEDURE — OTHER COUNSELING: OTHER

## 2023-11-21 PROCEDURE — 99212 OFFICE O/P EST SF 10 MIN: CPT | Mod: 24

## 2023-11-21 ASSESSMENT — LOCATION DETAILED DESCRIPTION DERM
LOCATION DETAILED: LEFT SUPERIOR ANTERIOR NECK
LOCATION DETAILED: LEFT LATERAL BUCCAL CHEEK

## 2023-11-21 ASSESSMENT — LOCATION SIMPLE DESCRIPTION DERM
LOCATION SIMPLE: LEFT ANTERIOR NECK
LOCATION SIMPLE: LEFT CHEEK

## 2023-11-21 ASSESSMENT — LOCATION ZONE DERM
LOCATION ZONE: FACE
LOCATION ZONE: NECK

## 2023-11-21 NOTE — HPI: SKIN LESIONS
Is This A New Presentation, Or A Follow-Up?: Skin Lesions
Additional History: We are still waiting on the tissue fungal cultures and we will still anticipate punch tissue culture for mycobactria next week once it has been at least 2 weeks off antibiotics. She is requesting to attempt aspiration and draiange of a couple areas.

## 2023-11-28 ENCOUNTER — APPOINTMENT (OUTPATIENT)
Dept: URBAN - METROPOLITAN AREA CLINIC 252 | Age: 48
Setting detail: DERMATOLOGY
End: 2023-11-28

## 2023-11-28 DIAGNOSIS — L0390 CELLULITIS AND ABSCESS OF UNSPECIFIED SITES: ICD-10-CM

## 2023-11-28 DIAGNOSIS — L0391 CELLULITIS AND ABSCESS OF UNSPECIFIED SITES: ICD-10-CM

## 2023-11-28 PROBLEM — L02.11 CUTANEOUS ABSCESS OF NECK: Status: ACTIVE | Noted: 2023-11-28

## 2023-11-28 PROBLEM — L02.01 CUTANEOUS ABSCESS OF FACE: Status: ACTIVE | Noted: 2023-11-28

## 2023-11-28 PROCEDURE — OTHER ORDER TESTS: OTHER

## 2023-11-28 PROCEDURE — 99212 OFFICE O/P EST SF 10 MIN: CPT

## 2023-11-28 PROCEDURE — OTHER COUNSELING: OTHER

## 2023-11-28 ASSESSMENT — LOCATION SIMPLE DESCRIPTION DERM
LOCATION SIMPLE: NECK
LOCATION SIMPLE: LEFT CHEEK

## 2023-11-28 ASSESSMENT — LOCATION DETAILED DESCRIPTION DERM
LOCATION DETAILED: LEFT CENTRAL BUCCAL CHEEK
LOCATION DETAILED: LEFT CENTRAL LATERAL NECK

## 2023-11-28 ASSESSMENT — LOCATION ZONE DERM
LOCATION ZONE: NECK
LOCATION ZONE: FACE

## 2023-11-28 NOTE — PROCEDURE: COUNSELING
Patient Specific Counseling (Will Not Stick From Patient To Patient): A 3mm punch was obtained today after discussed of risks and expectations. This tissue was sent in a sterile container with a moistened gauze using preservative free saline.  Tissue will be sent for tissue mycobacterial culture. Specific requirements for this test were obtained from [facility] prior to today. \\nCall if any sudden worsening. \\nWith regard to her seroma/abscess she is requestung additional aspiration. A very small amount of puss was extruded from left cheek and no puss from left anterior neck. \\nVerbal consent obtained today prior to procedure today.
Detail Level: Detailed

## 2023-11-28 NOTE — PROCEDURE: ORDER TESTS
Expected Date Of Service: 11/28/2023
Performing Laboratory: -[id number]
Lab Facility: 0
Billing Type: Third-Party Bill
Bill For Surgical Tray: no

## 2023-11-28 NOTE — HPI: SKIN LESIONS
Is This A New Presentation, Or A Follow-Up?: Skin Lesions
Additional History: Denies fever or chills. Here for mycobacteria tissue culture today. Fungal tissue culture is still pending. Her last use of any antibiotic was 14 days ago. The problem is not improving but the induration on neck is slightly worsening but non-tender.

## 2023-11-29 ENCOUNTER — RX ONLY (RX ONLY)
Age: 48
End: 2023-11-29

## 2023-11-29 RX ORDER — ITRACONAZOLE 100 MG/1
CAPSULE ORAL
Qty: 60 | Refills: 0 | Status: ERX | COMMUNITY
Start: 2023-11-28

## 2023-11-29 RX ORDER — SULFAMETHOXAZOLE AND TRIMETHOPRIM 800; 160 MG/1; MG/1
TABLET ORAL
Qty: 60 | Refills: 0 | Status: ERX | COMMUNITY
Start: 2023-11-28

## 2023-11-29 RX ORDER — PREDNISONE 10 MG/1
TABLET ORAL
Qty: 30 | Refills: 0 | Status: ERX | COMMUNITY
Start: 2023-11-28

## 2023-12-14 ENCOUNTER — RX ONLY (RX ONLY)
Age: 48
End: 2023-12-14

## 2023-12-14 RX ORDER — SULFAMETHOXAZOLE AND TRIMETHOPRIM 800; 160 MG/1; MG/1
TABLET ORAL
Qty: 60 | Refills: 0 | Status: ERX

## 2023-12-14 RX ORDER — DOXYCYCLINE 100 MG/1
CAPSULE ORAL
Qty: 60 | Refills: 0 | Status: ERX | COMMUNITY
Start: 2023-12-14

## 2023-12-19 ENCOUNTER — APPOINTMENT (OUTPATIENT)
Dept: URBAN - METROPOLITAN AREA CLINIC 252 | Age: 48
Setting detail: DERMATOLOGY
End: 2023-12-20

## 2023-12-19 DIAGNOSIS — L0390 CELLULITIS AND ABSCESS OF UNSPECIFIED SITES: ICD-10-CM

## 2023-12-19 DIAGNOSIS — L0391 CELLULITIS AND ABSCESS OF UNSPECIFIED SITES: ICD-10-CM

## 2023-12-19 PROBLEM — L02.01 CUTANEOUS ABSCESS OF FACE: Status: ACTIVE | Noted: 2023-12-19

## 2023-12-19 PROBLEM — L02.11 CUTANEOUS ABSCESS OF NECK: Status: ACTIVE | Noted: 2023-12-19

## 2023-12-19 PROCEDURE — OTHER COUNSELING: OTHER

## 2023-12-19 PROCEDURE — 99212 OFFICE O/P EST SF 10 MIN: CPT

## 2023-12-19 PROCEDURE — 36415 COLL VENOUS BLD VENIPUNCTURE: CPT

## 2023-12-19 PROCEDURE — OTHER ORDER TESTS: OTHER

## 2023-12-19 PROCEDURE — OTHER VENIPUNCTURE: OTHER

## 2023-12-19 ASSESSMENT — LOCATION DETAILED DESCRIPTION DERM
LOCATION DETAILED: LEFT CENTRAL BUCCAL CHEEK
LOCATION DETAILED: LEFT CENTRAL LATERAL NECK

## 2023-12-19 ASSESSMENT — LOCATION ZONE DERM
LOCATION ZONE: FACE
LOCATION ZONE: NECK

## 2023-12-19 ASSESSMENT — LOCATION SIMPLE DESCRIPTION DERM
LOCATION SIMPLE: NECK
LOCATION SIMPLE: LEFT CHEEK

## 2023-12-19 NOTE — PROCEDURE: ORDER TESTS
Billing Type: Third-Party Bill
Bill For Surgical Tray: no
Performing Laboratory: -[id number]
Expected Date Of Service: 12/19/2023
Lab Facility: 0

## 2023-12-19 NOTE — PROCEDURE: COUNSELING
Patient Specific Counseling (Will Not Stick From Patient To Patient): Discussed results show mycobacterium, awaiting sensitivity report.\\nWith regard to her seroma/abscess she is requestung additional aspiration. A very small amount of puss was extruded from left cheek and from left anterior neck. \\nVerbal consent obtained today prior to procedure today.\\nDiscussed anticipation of staying on antibiotic for 4- 6 months.  A change may or may not be necessary pending sensitivities. May continue the prednisone 10mg qd.
Detail Level: Detailed

## 2023-12-19 NOTE — HPI: RASH
Is This A New Presentation, Or A Follow-Up?: Rash
Additional History: She reports some constipation.  Denies fever or chills. Culture showed 2 mycobacteria and sensitivities are still pending. She is wanting to attempt more drainage today and review the plan.

## 2023-12-28 DIAGNOSIS — F41.1 GAD (GENERALIZED ANXIETY DISORDER): ICD-10-CM

## 2023-12-28 RX ORDER — CITALOPRAM HYDROBROMIDE 40 MG/1
40 TABLET ORAL DAILY
Qty: 30 TABLET | Refills: 1 | Status: SHIPPED | OUTPATIENT
Start: 2023-12-28 | End: 2024-02-05

## 2023-12-28 RX ORDER — CITALOPRAM HYDROBROMIDE 20 MG/1
30 TABLET ORAL DAILY
Qty: 135 TABLET | Refills: 1 | OUTPATIENT
Start: 2023-12-28

## 2023-12-28 NOTE — TELEPHONE ENCOUNTER
Looks like not see Kehr in past year. Will give emergency refill once she sets up appointment with kehr or one of our new providers. Nathan Fisher MD

## 2023-12-28 NOTE — TELEPHONE ENCOUNTER
Patient scheduled with Kristen Kehr for 2/5/24. Can this be refilled to get her through to appointment.    Charmaine Rice,    [facility]

## 2023-12-29 ENCOUNTER — RX ONLY (RX ONLY)
Age: 48
End: 2023-12-29

## 2023-12-29 RX ORDER — SULFAMETHOXAZOLE AND TRIMETHOPRIM 800; 160 MG/1; MG/1
TABLET ORAL
Qty: 60 | Refills: 0 | Status: ERX

## 2023-12-29 RX ORDER — DOXYCYCLINE 100 MG/1
CAPSULE ORAL
Qty: 60 | Refills: 0 | Status: ERX

## 2024-01-09 ENCOUNTER — APPOINTMENT (OUTPATIENT)
Dept: URBAN - METROPOLITAN AREA CLINIC 252 | Age: 49
Setting detail: DERMATOLOGY
End: 2024-01-09

## 2024-01-09 VITALS — WEIGHT: 160 LBS | HEIGHT: 68 IN

## 2024-01-09 DIAGNOSIS — L0391 CELLULITIS AND ABSCESS OF UNSPECIFIED SITES: ICD-10-CM

## 2024-01-09 DIAGNOSIS — L0390 CELLULITIS AND ABSCESS OF UNSPECIFIED SITES: ICD-10-CM

## 2024-01-09 PROBLEM — L02.11 CUTANEOUS ABSCESS OF NECK: Status: ACTIVE | Noted: 2024-01-09

## 2024-01-09 PROBLEM — L02.01 CUTANEOUS ABSCESS OF FACE: Status: ACTIVE | Noted: 2024-01-09

## 2024-01-09 PROCEDURE — OTHER COUNSELING: OTHER

## 2024-01-09 PROCEDURE — 99214 OFFICE O/P EST MOD 30 MIN: CPT

## 2024-01-09 ASSESSMENT — LOCATION ZONE DERM
LOCATION ZONE: FACE
LOCATION ZONE: NECK

## 2024-01-09 ASSESSMENT — LOCATION SIMPLE DESCRIPTION DERM
LOCATION SIMPLE: LEFT CHEEK
LOCATION SIMPLE: NECK

## 2024-01-09 ASSESSMENT — LOCATION DETAILED DESCRIPTION DERM
LOCATION DETAILED: LEFT CENTRAL BUCCAL CHEEK
LOCATION DETAILED: LEFT CENTRAL LATERAL NECK

## 2024-01-09 NOTE — HPI: SKIN LESIONS
Is This A New Presentation, Or A Follow-Up?: Skin Lesions
Additional History: Culture showed 2 forms of mycobacteria and sensitivities are still pending on one. She got a second opinion from a plastic surgeon in [facility] who said this may be a granulomatous inflammatory problem as well. This is not gotten worse nor singificantly better althout no fluctuant lesions at this time. No active draining or tender abscesses.

## 2024-01-09 NOTE — PROCEDURE: COUNSELING
Patient Specific Counseling (Will Not Stick From Patient To Patient): -Discussed her current regimen of bactrim ds and doxy bid.\\n-Discussed fungal culture. No fungus is present.\\n-Pt will continue to take Bactrim and doxycycline until we get the second set of sensitivities. We will likely be discontinue the doxycycline since intermediate but will wait for second sensitivities to guide the next steps. \\n-Keep wounds bandage when/it drainage develops. \\n-Recommend avoiding using makeup brushes over open wound sites.\\n-pt will follow up in 10-12 days for CMP and CBC lab work in nurse visit.
Detail Level: Simple

## 2024-01-18 ENCOUNTER — APPOINTMENT (OUTPATIENT)
Dept: URBAN - METROPOLITAN AREA CLINIC 252 | Age: 49
Setting detail: DERMATOLOGY
End: 2024-01-18

## 2024-01-18 DIAGNOSIS — L08.89 OTHER SPECIFIED LOCAL INFECTIONS OF THE SKIN AND SUBCUTANEOUS TISSUE: ICD-10-CM

## 2024-01-18 PROCEDURE — 36415 COLL VENOUS BLD VENIPUNCTURE: CPT

## 2024-01-18 PROCEDURE — OTHER ORDER TESTS: OTHER

## 2024-01-18 PROCEDURE — OTHER VENIPUNCTURE: OTHER

## 2024-01-18 ASSESSMENT — LOCATION ZONE DERM: LOCATION ZONE: ARM

## 2024-01-18 ASSESSMENT — LOCATION DETAILED DESCRIPTION DERM: LOCATION DETAILED: LEFT ANTECUBITAL SKIN

## 2024-01-18 ASSESSMENT — LOCATION SIMPLE DESCRIPTION DERM: LOCATION SIMPLE: LEFT UPPER ARM

## 2024-01-18 NOTE — PROCEDURE: ORDER TESTS
Performing Laboratory: -[id number]
Bill For Surgical Tray: no
Lab Facility: 0
Expected Date Of Service: 01/18/2024
Billing Type: Third-Party Bill

## 2024-01-30 NOTE — COMMUNITY RESOURCES LIST (ENGLISH)
01/30/2024   [facility]  N/A  For questions about this resource list or additional care needs, please contact your primary care clinic or care manager.  Phone: [phone]   Email: N/A   Address: [facility], [id number] [place] [address]   Hours: N/A        Hotlines and Helplines       Hotline - Housing crisis  1  [facility] Housing Resource Line Distance: 7.57 miles      Phone/Virtual   [address] [place] [address]  Language: English  Hours: Mon - Sun Open 24 Hours   Phone: (933) 219-7947 Website: https://www.[facility]./2689/Basic-Needs     2  [facility] Distance: 15.28 miles      Phone/Virtual   [address] [place] [address]  Language: English  Hours: Mon - Sun Open 24 Hours   Phone: (242) 752-5414 Email: communications@[facility].org Website: https://[facility].org/oursaviourshousing/          Housing       Coordinated Entry access point  3  [facility]  Office - [facility] Distance: 3.3 miles      Phone/Virtual   [address] [place] [address]  Language: English  Hours: Mon - Fri 8:30 AM - 12:00 PM , Mon - Fri 1:00 PM - 4:00 PM  Fees: Free   Phone: (815) 299-1705 Ext.2 Email: chano@[facility].org Website: https://www.[facility].org/usn/     4  [facility] ([facility] - Housing Services Distance: 15.41 miles      In-Person   [address] [place] [address]  Language: English  Hours: Mon - Fri 9:00 AM - 5:00 PM  Fees: Free   Phone: (620) 750-7558 Email: housing@[facility].org Website: http://www.[facility].org/housing     Drop-in center or day shelter  5  [facility] [employer] Distance: 13.83 miles      In-Person   [address] [place] [address]  Language: English, Hmong, German, Wallisian  Hours: Mon - Thu 8:30 AM - 4:30 PM , Sat - Sun 9:00 AM - 12:00 PM  Fees: Free   Phone: (913) 248-7208 Email: info@[employer].org Website: https://[employer].org/     6  [facility]  [facility] Distance: 14.85 miles      In-Person   [address] [place] [address]  Language: English, Anguillan, Bahamian  Hours: Mon - Sat 7:00 AM - 3:00 PM  Fees: Free, Self Pay   Phone: (612) 402-8443 Email: info@[employer] Website: https://www.[employer]es[employer].me.[employer]/locations/opportunity-center/     Housing search assistance  7  [facility] [employer] ([facility]) - [facility] Rental Housing Distance: 3.3 miles      In-Person, Phone/Virtual   [address] [place] [address]  Language: English  Hours: Mon - Fri 8:00 AM - 4:30 PM  Fees: Free   Phone: (923) 979-1216 Email: accap@[facility].org Website: http://www.accap.org     8  Neighborhood Assistance [employer] ([employer] Distance: 8.54 miles      Phone/Virtual   [address] [facility] [address] [place] [address]  Language: English, Bahamian  Hours: Mon - Fri 9:00 AM - 5:00 PM  Fees: Free   Phone: (748) 235-1206 Email: services@[employer] Website: https://www.[employer]     Shelter for families  9  [facility] Distance: 11.78 miles      In-Person   [address] [place] [address]  Language: English  Hours: Mon - Fri 3:00 PM - 9:00 AM , Sat - Sun Open 24 Hours  Fees: Free   Phone: (634) 820-8780 Ext.1 Website: https://www.[facility].org/2020/07/03/emergency-family-shelter/     Shelter for individuals  10  [facility] Distance: 14.16 miles      In-Person   [address] [place] [address]  Language: English  Hours: Mon - Fri 4:00 PM - 9:00 AM  Fees: Free   Phone: (235) 504-2163 Email: pennie@[facility].org Website: https://[facility].org/[facility]/     11  [facility] Distance: 15.28 miles      In-Person   [address] [place] [address]  Language: English  Hours: Mon - Sun Open 24 Hours  Fees: Free   Phone: (532) 692-9955 Email: communications@[facility].org  Website: https://[facility].org/oursaviourshousing/          Important Numbers & Websites       Emergency Services   911  [facility]   311  Poison Control   (876) 951-3136  Suicide Prevention Lifeline   (861) 893-9631 (TALK)  Child Abuse Hotline   (614) 161-4905 (4-A-Child)  Sexual Assault Hotline   (712) 143-1419 (HOPE)  National Runaway Safeline   (157) 494-3129 (RUNAWAY)  All-Options Talkline   (928) 565-9031  Substance Abuse Referral   (786) 460-3944 (HELP)

## 2024-02-05 ENCOUNTER — OFFICE VISIT (OUTPATIENT)
Dept: FAMILY MEDICINE | Facility: CLINIC | Age: 49
End: 2024-02-05
Payer: COMMERCIAL

## 2024-02-05 VITALS
HEART RATE: 87 BPM | RESPIRATION RATE: 18 BRPM | HEIGHT: 66 IN | BODY MASS INDEX: 26.84 KG/M2 | DIASTOLIC BLOOD PRESSURE: 88 MMHG | OXYGEN SATURATION: 96 % | TEMPERATURE: 97.8 F | SYSTOLIC BLOOD PRESSURE: 135 MMHG | WEIGHT: 167 LBS

## 2024-02-05 DIAGNOSIS — F41.1 GAD (GENERALIZED ANXIETY DISORDER): Primary | ICD-10-CM

## 2024-02-05 PROCEDURE — 90480 ADMN SARSCOV2 VAC 1/ONLY CMP: CPT | Performed by: PHYSICIAN ASSISTANT

## 2024-02-05 PROCEDURE — 99213 OFFICE O/P EST LOW 20 MIN: CPT | Mod: 25 | Performed by: PHYSICIAN ASSISTANT

## 2024-02-05 PROCEDURE — 90471 IMMUNIZATION ADMIN: CPT | Performed by: PHYSICIAN ASSISTANT

## 2024-02-05 PROCEDURE — 90686 IIV4 VACC NO PRSV 0.5 ML IM: CPT | Performed by: PHYSICIAN ASSISTANT

## 2024-02-05 PROCEDURE — 91320 SARSCV2 VAC 30MCG TRS-SUC IM: CPT | Performed by: PHYSICIAN ASSISTANT

## 2024-02-05 RX ORDER — SULFAMETHOXAZOLE/TRIMETHOPRIM 800-160 MG
TABLET ORAL
COMMUNITY
Start: 2024-01-23 | End: 2024-07-14

## 2024-02-05 RX ORDER — SEMAGLUTIDE 1.34 MG/ML
1 INJECTION, SOLUTION SUBCUTANEOUS
COMMUNITY
End: 2024-07-14

## 2024-02-05 RX ORDER — DOXYCYCLINE 100 MG/1
CAPSULE ORAL
COMMUNITY
Start: 2023-12-29 | End: 2024-07-14

## 2024-02-05 RX ORDER — CITALOPRAM HYDROBROMIDE 40 MG/1
40 TABLET ORAL DAILY
Qty: 90 TABLET | Refills: 3 | Status: SHIPPED | OUTPATIENT
Start: 2024-02-05 | End: 2024-07-14

## 2024-02-05 ASSESSMENT — ANXIETY QUESTIONNAIRES
GAD7 TOTAL SCORE: 0
1. FEELING NERVOUS, ANXIOUS, OR ON EDGE: NOT AT ALL
3. WORRYING TOO MUCH ABOUT DIFFERENT THINGS: NOT AT ALL
6. BECOMING EASILY ANNOYED OR IRRITABLE: NOT AT ALL
7. FEELING AFRAID AS IF SOMETHING AWFUL MIGHT HAPPEN: NOT AT ALL
2. NOT BEING ABLE TO STOP OR CONTROL WORRYING: NOT AT ALL
5. BEING SO RESTLESS THAT IT IS HARD TO SIT STILL: NOT AT ALL
GAD7 TOTAL SCORE: 0

## 2024-02-05 ASSESSMENT — ENCOUNTER SYMPTOMS: NERVOUS/ANXIOUS: 1

## 2024-02-05 ASSESSMENT — PATIENT HEALTH QUESTIONNAIRE - PHQ9: 5. POOR APPETITE OR OVEREATING: NOT AT ALL

## 2024-02-05 ASSESSMENT — PAIN SCALES - GENERAL: PAINLEVEL: NO PAIN (0)

## 2024-02-05 NOTE — PROGRESS NOTES
"  Assessment & Plan     GANESH (generalized anxiety disorder)  Continue with her current medication.   Refills given x 1 year.   Preventative health screening reviewed, she declines at this time  - citalopram (CELEXA) 40 MG tablet; Take 1 tablet (40 mg) by mouth daily              BMI  Estimated body mass index is 26.95 kg/m  as calculated from the following:    Height as of this encounter: 1.676 m (5' 6\").    Weight as of this encounter: 75.8 kg (167 lb).   Weight management plan: Discussed healthy diet and exercise guidelines          Romeo Maciel is a 48 year old, presenting for the following health issues:  Anxiety (Follow up after taking meds)        2/5/2024     3:44 PM   Additional Questions   Roomed by Pal MCCLURE MA     Anxiety    History of Present Illness       Reason for visit:  Follow up    She eats 0-1 servings of fruits and vegetables daily.She consumes 0 sweetened beverage(s) daily.She exercises with enough effort to increase her heart rate 60 or more minutes per day.  She exercises with enough effort to increase her heart rate 3 or less days per week.   She is taking medications regularly.                 Review of Systems  Constitutional, HEENT, cardiovascular, pulmonary, GI, , musculoskeletal, neuro, skin, endocrine and psych systems are negative, except as otherwise noted.      Objective    /88   Pulse 87   Temp 97.8  F (36.6  C) (Tympanic)   Resp 18   Ht 1.676 m (5' 6\")   Wt 75.8 kg (167 lb)   LMP  (LMP Unknown)   SpO2 96%   Breastfeeding No   BMI 26.95 kg/m    Body mass index is 26.95 kg/m .  Physical Exam   GENERAL: alert and no distress  PSYCH: mentation appears normal, affect normal/bright            Signed Electronically by: Kristen M. Kehr, PA-C    "

## 2024-02-05 NOTE — PATIENT INSTRUCTIONS
Schedule your routine preventative exam within the year.   You are due for fasting lab tests (cholesterol, diabetes screening)  Colon cancer screening  mammogram

## 2024-02-09 ENCOUNTER — RX ONLY (RX ONLY)
Age: 49
End: 2024-02-09

## 2024-02-09 ENCOUNTER — APPOINTMENT (OUTPATIENT)
Dept: URBAN - METROPOLITAN AREA CLINIC 252 | Age: 49
Setting detail: DERMATOLOGY
End: 2024-02-09

## 2024-02-09 DIAGNOSIS — L0390 CELLULITIS AND ABSCESS OF UNSPECIFIED SITES: ICD-10-CM

## 2024-02-09 DIAGNOSIS — L0391 CELLULITIS AND ABSCESS OF UNSPECIFIED SITES: ICD-10-CM

## 2024-02-09 PROBLEM — L02.91 CUTANEOUS ABSCESS, UNSPECIFIED: Status: ACTIVE | Noted: 2024-02-09

## 2024-02-09 PROCEDURE — OTHER COUNSELING: OTHER

## 2024-02-09 RX ORDER — SULFAMETHOXAZOLE AND TRIMETHOPRIM 800; 160 MG/1; MG/1
TABLET ORAL
Qty: 60 | Refills: 0 | Status: CANCELLED

## 2024-02-09 NOTE — PROCEDURE: COUNSELING
Detail Level: Detailed
Patient Specific Counseling (Will Not Stick From Patient To Patient): Attempted to repeat mycobacteria culture via aspiration no drainage was obtainable. See history of present illness (HPI) for details. Sent intramail message to Dr. Rossi Medel to update.

## 2024-02-09 NOTE — HPI: SKIN LESIONS
Is This A New Presentation, Or A Follow-Up?: Skin Lesions
Additional History: This patient walked in today to get an update (we had been giving her updates generally weekly). Her condition is improved from baseline but stagnant with regard to her non-tender induration. She had a small abscess (about 0.8cm) that recent drained a bloody/brownish tinged drainage. No fluctuant lesions today. I with patient informed consent attempted to aspirate 2 sited with no puss extruded.  She went to see a plastic surgeon who referred her to infectious disease who wanted to discuss the case with me. I called Dr. Dwain Vidales (Infectious disease) on his cell at [phone]. He had said they he would have preferred having a quinolone on board at the start and is concerned that she may be developing resistance to the Bactrim since the doxycycline intermediate sensitivity can result in increased risk of resistance development with the Bactrim. He is taking her off all antibiotics today with the hopes to be able to re-isolate the bacteria and start over. (Even if the lab is able to isolate the second mycobacteria). He strongly prefers this over adding the cipro since in the event this has become resistant to bactrim then this can result in cipro resistance that leaves is with few options other than IV antibiotics. Dr. Vidales advised that he would be contacting the patient to inform her to stop the antibiotics.\\n\\nThe patient reports feeling normal and denies fever at any point during this infection.\\n

## 2024-02-20 ENCOUNTER — APPOINTMENT (OUTPATIENT)
Dept: URBAN - METROPOLITAN AREA CLINIC 252 | Age: 49
Setting detail: DERMATOLOGY
End: 2024-02-20

## 2024-02-20 VITALS — WEIGHT: 165 LBS | HEIGHT: 68 IN

## 2024-02-20 DIAGNOSIS — L0390 CELLULITIS AND ABSCESS OF UNSPECIFIED SITES: ICD-10-CM

## 2024-02-20 DIAGNOSIS — L0391 CELLULITIS AND ABSCESS OF UNSPECIFIED SITES: ICD-10-CM

## 2024-02-20 PROBLEM — L02.11 CUTANEOUS ABSCESS OF NECK: Status: ACTIVE | Noted: 2024-02-20

## 2024-02-20 PROBLEM — L02.01 CUTANEOUS ABSCESS OF FACE: Status: ACTIVE | Noted: 2024-02-20

## 2024-02-20 PROBLEM — L02.211 CUTANEOUS ABSCESS OF ABDOMINAL WALL: Status: ACTIVE | Noted: 2024-02-20

## 2024-02-20 PROCEDURE — OTHER COUNSELING: OTHER

## 2024-02-20 PROCEDURE — 99212 OFFICE O/P EST SF 10 MIN: CPT

## 2024-02-20 PROCEDURE — OTHER ORDER TESTS: OTHER

## 2024-02-20 PROCEDURE — OTHER MIPS QUALITY: OTHER

## 2024-02-20 ASSESSMENT — LOCATION DETAILED DESCRIPTION DERM
LOCATION DETAILED: RIGHT RIB CAGE
LOCATION DETAILED: LEFT LATERAL SUBMANDIBULAR CHEEK
LOCATION DETAILED: LEFT SUPERIOR ANTERIOR NECK

## 2024-02-20 ASSESSMENT — LOCATION ZONE DERM
LOCATION ZONE: NECK
LOCATION ZONE: FACE
LOCATION ZONE: TRUNK

## 2024-02-20 ASSESSMENT — LOCATION SIMPLE DESCRIPTION DERM
LOCATION SIMPLE: LEFT ANTERIOR NECK
LOCATION SIMPLE: LEFT CHEEK
LOCATION SIMPLE: ABDOMEN

## 2024-02-20 NOTE — HPI: RASH
Is This A New Presentation, Or A Follow-Up?: Rash
Additional History: Pt here 7 days post antibiotics for repeated tissue culture. Infectious disease says that 7 days is enough time to repeat this test. Tried confirming with Dr. ZAMBRANO but he never returned my message, so this is per pt report.

## 2024-02-20 NOTE — PROCEDURE: ORDER TESTS
Expected Date Of Service: 02/20/2024
Bill For Surgical Tray: no
Billing Type: Third-Party Bill
Lab Facility: 0
Performing Laboratory: -[id number]

## 2024-02-20 NOTE — PROCEDURE: COUNSELING
Patient Specific Counseling (Will Not Stick From Patient To Patient): She has been off antibiotics for 7 days. \\nUsed 2 separate 3 mm punches for tissue culture on left neck and right flank. She reports the abscesses on the right axilla has been present this whole time but she has not previously mentioned. \\nWe prepared sterile cups with sterile gauze and used prefervative saline to wet the gauze and preservative free lidocaine. \\nPatient was insistent that infectious disease doctor wanted her to bring tissue cultures to his clinic for him to test. She stated she was going after this visit to drop them off. Advised this is unusual so tried to call Dr. ZAMBRANO (infectious disease) via his direct cell phone but he did not answer. Discussed it is possible that the risk posed is that this specimen may not be accepted by the other clinic since they were not the ones to perform the tissue culture. Patient expressed understanding. \\nTried calling infectious disease doctor and left a voicemail.\\nThe tissue on the neck was over a friable area to the tissue was friable.\\nConsent for the tissue cultures was obtained before the procedure after discussed of infection and scar. Patient expressed understanding. \\nSent specimens with patient and advised her to bring cultures back to our clinic if infectious disease doesn’t take them. Patient expressed understanding.
Detail Level: Detailed

## 2024-07-12 ENCOUNTER — E-VISIT (OUTPATIENT)
Dept: FAMILY MEDICINE | Facility: CLINIC | Age: 49
End: 2024-07-12
Payer: COMMERCIAL

## 2024-07-12 DIAGNOSIS — F41.9 ANXIETY: Primary | ICD-10-CM

## 2024-07-12 PROCEDURE — 99207 PR NON-BILLABLE SERV PER CHARTING: CPT | Performed by: PHYSICIAN ASSISTANT

## 2024-07-12 ASSESSMENT — ANXIETY QUESTIONNAIRES
4. TROUBLE RELAXING: NEARLY EVERY DAY
7. FEELING AFRAID AS IF SOMETHING AWFUL MIGHT HAPPEN: NOT AT ALL
GAD7 TOTAL SCORE: 18
GAD7 TOTAL SCORE: 18
8. IF YOU CHECKED OFF ANY PROBLEMS, HOW DIFFICULT HAVE THESE MADE IT FOR YOU TO DO YOUR WORK, TAKE CARE OF THINGS AT HOME, OR GET ALONG WITH OTHER PEOPLE?: SOMEWHAT DIFFICULT
6. BECOMING EASILY ANNOYED OR IRRITABLE: NEARLY EVERY DAY
3. WORRYING TOO MUCH ABOUT DIFFERENT THINGS: NEARLY EVERY DAY
IF YOU CHECKED OFF ANY PROBLEMS ON THIS QUESTIONNAIRE, HOW DIFFICULT HAVE THESE PROBLEMS MADE IT FOR YOU TO DO YOUR WORK, TAKE CARE OF THINGS AT HOME, OR GET ALONG WITH OTHER PEOPLE: SOMEWHAT DIFFICULT
7. FEELING AFRAID AS IF SOMETHING AWFUL MIGHT HAPPEN: NOT AT ALL
2. NOT BEING ABLE TO STOP OR CONTROL WORRYING: NEARLY EVERY DAY
5. BEING SO RESTLESS THAT IT IS HARD TO SIT STILL: NEARLY EVERY DAY
1. FEELING NERVOUS, ANXIOUS, OR ON EDGE: NEARLY EVERY DAY

## 2024-07-14 NOTE — TELEPHONE ENCOUNTER
:  Please contact this patient to schedule in my provider access spot 7/23 at 11:30 AM  Thank you.   Kristen Kehr PA-C

## 2024-07-29 ENCOUNTER — OFFICE VISIT (OUTPATIENT)
Dept: FAMILY MEDICINE | Facility: CLINIC | Age: 49
End: 2024-07-29
Payer: COMMERCIAL

## 2024-07-29 VITALS
SYSTOLIC BLOOD PRESSURE: 132 MMHG | OXYGEN SATURATION: 97 % | DIASTOLIC BLOOD PRESSURE: 82 MMHG | RESPIRATION RATE: 20 BRPM | HEIGHT: 68 IN | WEIGHT: 158 LBS | BODY MASS INDEX: 23.95 KG/M2 | TEMPERATURE: 98 F | HEART RATE: 85 BPM

## 2024-07-29 DIAGNOSIS — D64.9 ANEMIA, UNSPECIFIED TYPE: ICD-10-CM

## 2024-07-29 DIAGNOSIS — N95.1 PERIMENOPAUSE: ICD-10-CM

## 2024-07-29 DIAGNOSIS — R41.840 POOR CONCENTRATION: ICD-10-CM

## 2024-07-29 DIAGNOSIS — F41.9 ANXIETY: Primary | ICD-10-CM

## 2024-07-29 LAB
BASOPHILS # BLD AUTO: 0 10E3/UL (ref 0–0.2)
BASOPHILS NFR BLD AUTO: 0 %
EOSINOPHIL # BLD AUTO: 0.1 10E3/UL (ref 0–0.7)
EOSINOPHIL NFR BLD AUTO: 1 %
ERYTHROCYTE [DISTWIDTH] IN BLOOD BY AUTOMATED COUNT: 15.8 % (ref 10–15)
HCT VFR BLD AUTO: 41.3 % (ref 35–47)
HGB BLD-MCNC: 13 G/DL (ref 11.7–15.7)
IMM GRANULOCYTES # BLD: 0 10E3/UL
IMM GRANULOCYTES NFR BLD: 0 %
LYMPHOCYTES # BLD AUTO: 1 10E3/UL (ref 0.8–5.3)
LYMPHOCYTES NFR BLD AUTO: 12 %
MCH RBC QN AUTO: 35 PG (ref 26.5–33)
MCHC RBC AUTO-ENTMCNC: 31.5 G/DL (ref 31.5–36.5)
MCV RBC AUTO: 111 FL (ref 78–100)
MONOCYTES # BLD AUTO: 0.7 10E3/UL (ref 0–1.3)
MONOCYTES NFR BLD AUTO: 8 %
NEUTROPHILS # BLD AUTO: 6.8 10E3/UL (ref 1.6–8.3)
NEUTROPHILS NFR BLD AUTO: 78 %
PLATELET # BLD AUTO: 425 10E3/UL (ref 150–450)
RBC # BLD AUTO: 3.71 10E6/UL (ref 3.8–5.2)
WBC # BLD AUTO: 8.7 10E3/UL (ref 4–11)

## 2024-07-29 PROCEDURE — 83001 ASSAY OF GONADOTROPIN (FSH): CPT | Performed by: PHYSICIAN ASSISTANT

## 2024-07-29 PROCEDURE — 82607 VITAMIN B-12: CPT | Performed by: PHYSICIAN ASSISTANT

## 2024-07-29 PROCEDURE — 82670 ASSAY OF TOTAL ESTRADIOL: CPT | Performed by: PHYSICIAN ASSISTANT

## 2024-07-29 PROCEDURE — 85025 COMPLETE CBC W/AUTO DIFF WBC: CPT | Performed by: PHYSICIAN ASSISTANT

## 2024-07-29 PROCEDURE — 36415 COLL VENOUS BLD VENIPUNCTURE: CPT | Performed by: PHYSICIAN ASSISTANT

## 2024-07-29 PROCEDURE — 99214 OFFICE O/P EST MOD 30 MIN: CPT | Performed by: PHYSICIAN ASSISTANT

## 2024-07-29 RX ORDER — CITALOPRAM HYDROBROMIDE 20 MG/1
TABLET ORAL
Qty: 90 TABLET | Refills: 1 | Status: SHIPPED | OUTPATIENT
Start: 2024-07-29 | End: 2024-09-12

## 2024-07-29 NOTE — PATIENT INSTRUCTIONS
Appointment in 1 month to check on anxiety management.     Blood counts will be completed at both appointments, today and 1 month    Continue with your supplements

## 2024-07-29 NOTE — PROGRESS NOTES
Assessment & Plan     Anxiety  She will plan to get back on the citalopram start with 10 mg and titrate to 20 mg, she was previously taking 40 mg. I will see her again next month, if anxiety is well controlled on the 20 mg, I will have her stay at this dose.   - citalopram (CELEXA) 20 MG tablet; 1/2 tablet daily for 1-2 weeks, then increase to 1 tablet daily    Poor concentration  Perimenopause  She is no longer on the depo.   I think the main cause of the poor concentration is the anxiety and anemia, but will check the following.   Discussed that since she is having some irregular bleeding, she is perimenopausal and if she would like to go back on the depo, this is an option for management of her cycles.  - Follicle stimulating hormone; Future  - Estradiol; Future  - Follicle stimulating hormone  - Estradiol    Anemia, unspecified type  Reviewed notes from Infectious Disease provider.   Anemia is improving since she has been off of the Bactrim and taking supplements.   I will check her CBC monthly per recommendations.   She has follow up in the Fall.   - CBC with platelets and differential; Future  - Vitamin B12; Future  - CBC with platelets and differential; Future  - Vitamin B12  - CBC with platelets and differential                Subjective   Janell is a 48 year old, presenting for the following health issues:  RECHECK        7/29/2024    10:53 AM   Additional Questions   Roomed by Pal MCCLURE MA   Accompanied by daughter     History of Present Illness       Reason for visit:  Side effects antibiotics    She eats 2-3 servings of fruits and vegetables daily.She consumes 0 sweetened beverage(s) daily.She exercises with enough effort to increase her heart rate 20 to 29 minutes per day.  She exercises with enough effort to increase her heart rate 4 days per week.   She is taking medications regularly.     Janell is here today with concerns about her memory, hormones and anemia.     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 She had follow up a few weeks ago. She is now off of all of the antibiotics. She developed neuropathy and anemia from antibiotics. Her hemoglobin has been improving with time and use of folate supplement. She was seen 7/23 per ID provider and recommendation to check CBC with primary clinic until she is seen again in the fall. During the treatment, she also stopped taking citalopram due to possible interaction with some of the antibiotics. She has been off of citalopram for months. She was taking this for management of anxiety. She has brain fog, poor concentration, persistent worries and wonders if perimenopause can be contributing along with anxiety. She also questions ADHD. She saw GYN previously, she was treated with depo provera for the irregular bleeding. Her last injection was in Sep 2023. She continues to have irregular bleeding.                 Review of Systems  Constitutional, HEENT, cardiovascular, pulmonary, GI, , musculoskeletal, neuro, skin, endocrine and psych systems are negative, except as otherwise noted.      Objective    /82   Pulse 85   Temp 98  F (36.7  C) (Tympanic)   Resp 20   Ht 1.715 m (5' 7.5\")   Wt 71.7 kg (158 lb)   LMP 07/24/2024 (Approximate)   SpO2 97%   Breastfeeding No   BMI 24.38 kg/m    Body mass index is 24.38 kg/m .  Physical Exam   GENERAL: alert and no distress  PSYCH: mentation appears normal, affect normal/bright, anxious, judgement and insight intact, and appearance well groomed        Signed Electronically by: Kristen M. Kehr, PA-C    "

## 2024-07-30 LAB
ESTRADIOL SERPL-MCNC: 27 PG/ML
FSH SERPL IRP2-ACNC: 7.1 MIU/ML
VIT B12 SERPL-MCNC: 692 PG/ML (ref 232–1245)

## 2024-08-14 ENCOUNTER — TELEPHONE (OUTPATIENT)
Dept: OBGYN | Facility: CLINIC | Age: 49
End: 2024-08-14
Payer: COMMERCIAL

## 2024-08-14 NOTE — TELEPHONE ENCOUNTER
Left message for patient to  to clinic at [phone].  Informed nurse only appointment for Depo 8/15/24 at 1pm cancelled.  Asked patient to called back for further detail and appropriate scheduling.    Patient has no current order and has not had Depo since 2023.  Patient will need to see a provider in order to get orders for this, unable to do this on a nurse only schedule.    Mary Anne Sheehan LPN on 8/14/2024 at 4:39 PM

## 2024-09-12 ENCOUNTER — OFFICE VISIT (OUTPATIENT)
Dept: FAMILY MEDICINE | Facility: CLINIC | Age: 49
End: 2024-09-12
Payer: COMMERCIAL

## 2024-09-12 VITALS
HEIGHT: 68 IN | DIASTOLIC BLOOD PRESSURE: 88 MMHG | HEART RATE: 97 BPM | RESPIRATION RATE: 20 BRPM | SYSTOLIC BLOOD PRESSURE: 136 MMHG | BODY MASS INDEX: 24.4 KG/M2 | TEMPERATURE: 96.9 F | WEIGHT: 161 LBS | OXYGEN SATURATION: 98 %

## 2024-09-12 DIAGNOSIS — F41.9 ANXIETY: Primary | ICD-10-CM

## 2024-09-12 DIAGNOSIS — D64.9 ANEMIA, UNSPECIFIED TYPE: ICD-10-CM

## 2024-09-12 LAB
BASOPHILS # BLD AUTO: 0 10E3/UL (ref 0–0.2)
BASOPHILS NFR BLD AUTO: 1 %
EOSINOPHIL # BLD AUTO: 0 10E3/UL (ref 0–0.7)
EOSINOPHIL NFR BLD AUTO: 1 %
ERYTHROCYTE [DISTWIDTH] IN BLOOD BY AUTOMATED COUNT: 15.1 % (ref 10–15)
HCT VFR BLD AUTO: 46.4 % (ref 35–47)
HGB BLD-MCNC: 15.1 G/DL (ref 11.7–15.7)
IMM GRANULOCYTES # BLD: 0 10E3/UL
IMM GRANULOCYTES NFR BLD: 0 %
LYMPHOCYTES # BLD AUTO: 1.2 10E3/UL (ref 0.8–5.3)
LYMPHOCYTES NFR BLD AUTO: 16 %
MCH RBC QN AUTO: 32.6 PG (ref 26.5–33)
MCHC RBC AUTO-ENTMCNC: 32.5 G/DL (ref 31.5–36.5)
MCV RBC AUTO: 100 FL (ref 78–100)
MONOCYTES # BLD AUTO: 0.8 10E3/UL (ref 0–1.3)
MONOCYTES NFR BLD AUTO: 10 %
NEUTROPHILS # BLD AUTO: 5.5 10E3/UL (ref 1.6–8.3)
NEUTROPHILS NFR BLD AUTO: 73 %
PLATELET # BLD AUTO: 299 10E3/UL (ref 150–450)
RBC # BLD AUTO: 4.63 10E6/UL (ref 3.8–5.2)
WBC # BLD AUTO: 7.5 10E3/UL (ref 4–11)

## 2024-09-12 PROCEDURE — 85025 COMPLETE CBC W/AUTO DIFF WBC: CPT | Performed by: PHYSICIAN ASSISTANT

## 2024-09-12 PROCEDURE — 90656 IIV3 VACC NO PRSV 0.5 ML IM: CPT | Performed by: PHYSICIAN ASSISTANT

## 2024-09-12 PROCEDURE — 90471 IMMUNIZATION ADMIN: CPT | Performed by: PHYSICIAN ASSISTANT

## 2024-09-12 PROCEDURE — 99213 OFFICE O/P EST LOW 20 MIN: CPT | Mod: 25 | Performed by: PHYSICIAN ASSISTANT

## 2024-09-12 PROCEDURE — 36415 COLL VENOUS BLD VENIPUNCTURE: CPT | Performed by: PHYSICIAN ASSISTANT

## 2024-09-12 RX ORDER — CITALOPRAM HYDROBROMIDE 40 MG/1
TABLET ORAL
Qty: 90 TABLET | Refills: 3 | Status: SHIPPED | OUTPATIENT
Start: 2024-09-12

## 2024-09-12 ASSESSMENT — PAIN SCALES - GENERAL: PAINLEVEL: NO PAIN (0)

## 2024-09-12 NOTE — PROGRESS NOTES
"  Assessment & Plan     Anxiety  Refills given for 40 mg daily  Encouraged counseling, she defers.   - citalopram (CELEXA) 40 MG tablet; 1 tablet daily    Anemia, unspecified type  Improved.   - CBC with platelets and differential; Future  - CBC with platelets and differential                Subjective   Janell is a 48 year old, presenting for the following health issues:  RECHECK        9/12/2024     9:46 AM   Additional Questions   Roomed by Pal MCCLURE MA     History of Present Illness       Reason for visit:  Follow up    She eats 2-3 servings of fruits and vegetables daily.She consumes 0 sweetened beverage(s) daily.She exercises with enough effort to increase her heart rate 20 to 29 minutes per day.  She exercises with enough effort to increase her heart rate 4 days per week.   She is taking medications regularly.     Janell is back on her citalopram and taking 40 mg.   She is very busy and has excessive stress.   She has noticed that her hair is growing back and she is making progress there.   She is due for another CBC prior to follow up with  Infectious Disease.   She also will need a refill of the citalopram at 40 mg.               Review of Systems  Constitutional, HEENT, cardiovascular, pulmonary, GI, , musculoskeletal, neuro, skin, endocrine and psych systems are negative, except as otherwise noted.      Objective    BP (!) 159/111   Pulse 97   Temp 96.9  F (36.1  C) (Tympanic)   Resp 20   Ht 1.715 m (5' 7.5\")   Wt 73 kg (161 lb)   LMP 08/26/2024 (Approximate)   SpO2 98%   Breastfeeding No   BMI 24.84 kg/m    Body mass index is 24.84 kg/m .  Physical Exam   GENERAL: alert and no distress  PSYCH: mentation appears normal, affect normal/bright    Results for orders placed or performed in visit on 09/12/24   CBC with platelets and differential     Status: Abnormal   Result Value Ref Range    WBC Count 7.5 4.0 - 11.0 10e3/uL    RBC Count 4.63 3.80 - 5.20 10e6/uL    Hemoglobin 15.1 11.7 - 15.7 " g/dL    Hematocrit 46.4 35.0 - 47.0 %     78 - 100 fL    MCH 32.6 26.5 - 33.0 pg    MCHC 32.5 31.5 - 36.5 g/dL    RDW 15.1 (H) 10.0 - 15.0 %    Platelet Count 299 150 - 450 10e3/uL    % Neutrophils 73 %    % Lymphocytes 16 %    % Monocytes 10 %    % Eosinophils 1 %    % Basophils 1 %    % Immature Granulocytes 0 %    Absolute Neutrophils 5.5 1.6 - 8.3 10e3/uL    Absolute Lymphocytes 1.2 0.8 - 5.3 10e3/uL    Absolute Monocytes 0.8 0.0 - 1.3 10e3/uL    Absolute Eosinophils 0.0 0.0 - 0.7 10e3/uL    Absolute Basophils 0.0 0.0 - 0.2 10e3/uL    Absolute Immature Granulocytes 0.0 <=0.4 10e3/uL   CBC with platelets and differential     Status: Abnormal    Narrative    The following orders were created for panel order CBC with platelets and differential.  Procedure                               Abnormality         Status                     ---------                               -----------         ------                     CBC with platelets and d...[[id number]]  Abnormal            Final result                 Please view results for these tests on the individual orders.             Signed Electronically by: Kristen M. Kehr, PA-C

## 2024-09-19 ENCOUNTER — ANCILLARY PROCEDURE (OUTPATIENT)
Dept: MAMMOGRAPHY | Facility: CLINIC | Age: 49
End: 2024-09-19
Attending: PHYSICIAN ASSISTANT
Payer: COMMERCIAL

## 2024-09-19 DIAGNOSIS — Z12.31 VISIT FOR SCREENING MAMMOGRAM: ICD-10-CM

## 2024-09-19 PROCEDURE — 77063 BREAST TOMOSYNTHESIS BI: CPT | Mod: TC | Performed by: RADIOLOGY

## 2024-09-19 PROCEDURE — 77067 SCR MAMMO BI INCL CAD: CPT | Mod: TC | Performed by: RADIOLOGY

## 2024-09-23 NOTE — PROGRESS NOTES
Assessment & Plan     Surveillance for Depo-Provera contraception  With elevated blood pressure, not good candidate for estrogen. Desires Depo Provera and injection given today. Discussed injection every 3 months, possible menstrual changes and other side effects. Discussed clinic policy for returning on time for injection. Discussed delay with return to ovulation and need for adequate calcium, weight bearing exercise.  - medroxyPROGESTERone (DEPO-PROVERA) injection 150 mg  - HCG qualitative urine; Future  - HCG qualitative urine    Elevated blood pressure reading without diagnosis of hypertension  Patient is hypertensive today but asymptomatic. Second BP reading was also above goal. Patient will  blood pressure cuff to monitor at home, but instructed to follow up with PCP regarding elevated readings. Instructed to go to emergency department if develops chest pain, shortness of breath, dizziness, vision changes, numbness, weakness.     Subjective   Janell is a 48 year old, presenting for the following health issues:  Depo Provera    HPI       Depo Provera    Patient had been on Depo Provera in the past, was doing well on it. Had some other health issues earlier this year and did not return to clinic to renew prescription. Cycles have resumed and also needs contraception. No adverse side effects while on it, desires to continue.  Did had FSH and Estradiol about 2 months ago, not showing menopause.  LMP was 9/19/24.  Patient's blood pressure elevated today and chart review shows it has been elevated on several recent office visits.  Denies headache, blurring of vision, chest pain, shortness of breath, dizziness, numbness, or weakness.     Review of Systems  Constitutional, HEENT, cardiovascular, pulmonary, gi and gu systems are negative, except as otherwise noted.      Objective    BP (!) 161/108 (BP Location: Right arm, Patient Position: Sitting, Cuff Size: Adult Regular)   Pulse 92   Ht 1.715 m (5'  "7.5\")   Wt 73.3 kg (161 lb 9.6 oz)   LMP 09/19/2024 (Exact Date)   SpO2 95%   BMI 24.94 kg/m    Body mass index is 24.94 kg/m .  Physical Exam   GENERAL: alert and no distress  MS: no gross musculoskeletal defects noted, no edema  SKIN: no suspicious lesions or rashes  PSYCH: mentation appears normal, affect normal/bright    Signed Electronically by: LALO Monreal CNP    "

## 2024-09-24 ENCOUNTER — OFFICE VISIT (OUTPATIENT)
Dept: OBGYN | Facility: CLINIC | Age: 49
End: 2024-09-24
Payer: COMMERCIAL

## 2024-09-24 VITALS
DIASTOLIC BLOOD PRESSURE: 100 MMHG | BODY MASS INDEX: 24.49 KG/M2 | OXYGEN SATURATION: 95 % | WEIGHT: 161.6 LBS | HEART RATE: 92 BPM | SYSTOLIC BLOOD PRESSURE: 154 MMHG | HEIGHT: 68 IN

## 2024-09-24 DIAGNOSIS — Z30.42 SURVEILLANCE FOR DEPO-PROVERA CONTRACEPTION: Primary | ICD-10-CM

## 2024-09-24 DIAGNOSIS — R03.0 ELEVATED BLOOD PRESSURE READING WITHOUT DIAGNOSIS OF HYPERTENSION: ICD-10-CM

## 2024-09-24 LAB — HCG UR QL: NEGATIVE

## 2024-09-24 PROCEDURE — 81025 URINE PREGNANCY TEST: CPT | Performed by: NURSE PRACTITIONER

## 2024-09-24 PROCEDURE — 96372 THER/PROPH/DIAG INJ SC/IM: CPT | Performed by: NURSE PRACTITIONER

## 2024-09-24 PROCEDURE — 99213 OFFICE O/P EST LOW 20 MIN: CPT | Mod: 25 | Performed by: NURSE PRACTITIONER

## 2024-09-24 RX ORDER — MEDROXYPROGESTERONE ACETATE 150 MG/ML
150 INJECTION, SUSPENSION INTRAMUSCULAR
Status: ACTIVE | OUTPATIENT
Start: 2024-09-24 | End: 2025-09-19

## 2024-09-24 RX ADMIN — MEDROXYPROGESTERONE ACETATE 150 MG: 150 INJECTION, SUSPENSION INTRAMUSCULAR at 13:20

## 2024-09-24 NOTE — PROGRESS NOTES

## 2024-09-24 NOTE — PATIENT INSTRUCTIONS
If you have any questions regarding your visit, Please contact your care team.     [facility]: [phone]  To Schedule an Appointment 24/7  Call: [phone]     Ana Archer- APRN CNP      Roxana Manriquez-Surgery Scheduler  Kamila-Surgery Scheduler         Monday 7:30am-2:00pm    Tuesday 7:30am-4:00pm    Wednesday 7:30am-2:00pm    Thursday 7:30am-11:00am    Friday 7:30am-2:00pm [address] [address]  Phone- [phone]   Fax- [phone]     Imaging Scheduling all locations  [phone]    [facility] Labor and Delivery  [address] [place] [address]   [place] [address] [phone]         Urgent Care locations:  [facility]   Monday-Friday  10 am - 8 pm  Saturday and Sunday   9 am - 5 pm     [phone]   If you need a medication refill, please contact your pharmacy. Please allow 3 business days for your refill to be completed.  As always, Thank you for trusting us with your healthcare needs!      see additional instructions from your care team below

## 2024-10-15 ENCOUNTER — MYC MEDICAL ADVICE (OUTPATIENT)
Dept: FAMILY MEDICINE | Facility: CLINIC | Age: 49
End: 2024-10-15
Payer: COMMERCIAL

## 2024-10-16 NOTE — TELEPHONE ENCOUNTER
Scheduled patient for 10/21/24 at 2:00 pm check in 1:40 pm with Kristen Kehr PA-C to discuss blood pressure medication.     Pal MCCLURE MA

## 2024-10-21 ENCOUNTER — OFFICE VISIT (OUTPATIENT)
Dept: FAMILY MEDICINE | Facility: CLINIC | Age: 49
End: 2024-10-21
Payer: COMMERCIAL

## 2024-10-21 VITALS
BODY MASS INDEX: 24.55 KG/M2 | OXYGEN SATURATION: 97 % | TEMPERATURE: 99 F | DIASTOLIC BLOOD PRESSURE: 98 MMHG | SYSTOLIC BLOOD PRESSURE: 134 MMHG | WEIGHT: 162 LBS | HEIGHT: 68 IN | HEART RATE: 90 BPM

## 2024-10-21 DIAGNOSIS — I10 ESSENTIAL HYPERTENSION: Primary | ICD-10-CM

## 2024-10-21 DIAGNOSIS — Z12.11 SCREEN FOR COLON CANCER: ICD-10-CM

## 2024-10-21 PROCEDURE — 99213 OFFICE O/P EST LOW 20 MIN: CPT | Performed by: PHYSICIAN ASSISTANT

## 2024-10-21 RX ORDER — LISINOPRIL 10 MG/1
10 TABLET ORAL DAILY
Qty: 90 TABLET | Refills: 0 | Status: SHIPPED | OUTPATIENT
Start: 2024-10-21

## 2024-10-21 ASSESSMENT — PAIN SCALES - GENERAL: PAINLEVEL: NO PAIN (0)

## 2024-10-21 NOTE — PROGRESS NOTES
"  Assessment & Plan     Essential hypertension  She will start lisinopril 10 mg daily  Side effects and how to take the medication discussed.  Recheck in 1 month with myself or nursing / BMP to be completed at that time  - lisinopril (ZESTRIL) 10 MG tablet; Take 1 tablet (10 mg) by mouth daily.    Screen for colon cancer  - Colonoscopy Screening  Referral; Future          BMI  Estimated body mass index is 25 kg/m  as calculated from the following:    Height as of this encounter: 1.715 m (5' 7.5\").    Weight as of this encounter: 73.5 kg (162 lb).             Subjective   Janell is a 48 year old, presenting for the following health issues:  Hypertension      10/21/2024     1:45 PM   Additional Questions   Roomed by Pal MCCLURE MA     History of Present Illness       Reason for visit:  Blood pressure meds        Janell is here to discuss hypertension.   She continues to monitor her blood pressure at home and readings are high.                 Review of Systems  Constitutional, HEENT, cardiovascular, pulmonary, GI, , musculoskeletal, neuro, skin, endocrine and psych systems are negative, except as otherwise noted.      Objective    BP (!) 134/98   Pulse 90   Temp 99  F (37.2  C) (Tympanic)   Ht 1.715 m (5' 7.5\")   Wt 73.5 kg (162 lb)   LMP 09/19/2024 (Exact Date)   SpO2 97%   Breastfeeding No   BMI 25.00 kg/m    Body mass index is 25 kg/m .  Physical Exam   GENERAL: alert and no distress  PSYCH: mentation appears normal, affect normal/bright            Signed Electronically by: Kristen M. Kehr, PA-C    "

## 2024-10-21 NOTE — PATIENT INSTRUCTIONS
Appointment with myself or nursing to recheck your blood pressure in 1 month.     Continue to monitor at home also    Check kidney function tests with lab when you return in 1 month

## 2024-11-11 ENCOUNTER — DOCUMENTATION ONLY (OUTPATIENT)
Dept: FAMILY MEDICINE | Facility: CLINIC | Age: 49
End: 2024-11-11
Payer: COMMERCIAL

## 2024-11-11 DIAGNOSIS — I10 ESSENTIAL HYPERTENSION: Primary | ICD-10-CM

## 2024-11-11 DIAGNOSIS — Z13.220 LIPID SCREENING: ICD-10-CM

## 2024-11-11 NOTE — PROGRESS NOTES
Aretha Wheatley has an upcoming lab appointment:    Future Appointments   Date Time Provider Department Center   11/21/2024  2:00 PM AN MA/LPN [facility]   11/21/2024  2:45 PM AN LAB [facility]       There is no order available. Please review and place either future orders or HMPO (Review of Health Maintenance Protocol Orders), as appropriate.    Health Maintenance Due   Topic    BMP     ANNUAL REVIEW OF HM ORDERS     LIPID      Yunior NASHT

## 2024-11-21 ENCOUNTER — ALLIED HEALTH/NURSE VISIT (OUTPATIENT)
Dept: FAMILY MEDICINE | Facility: CLINIC | Age: 49
End: 2024-11-21
Payer: COMMERCIAL

## 2024-11-21 ENCOUNTER — LAB (OUTPATIENT)
Dept: LAB | Facility: CLINIC | Age: 49
End: 2024-11-21
Payer: COMMERCIAL

## 2024-11-21 VITALS — SYSTOLIC BLOOD PRESSURE: 134 MMHG | HEART RATE: 99 BPM | DIASTOLIC BLOOD PRESSURE: 82 MMHG

## 2024-11-21 DIAGNOSIS — Z01.30 BP CHECK: Primary | ICD-10-CM

## 2024-11-21 DIAGNOSIS — Z13.220 LIPID SCREENING: ICD-10-CM

## 2024-11-21 DIAGNOSIS — I10 ESSENTIAL HYPERTENSION: ICD-10-CM

## 2024-11-21 NOTE — PROGRESS NOTES
SUBJECTIVE:  Aretha Wheatley is an 48 year old female who presents for a blood pressure check.    The reason for the visit is:  Was recently put on BP meds and needing to follow up.    The patient reports that she IS taking the medication as prescribed.     Current Outpatient Medications   Medication Sig Dispense Refill    citalopram (CELEXA) 40 MG tablet 1 tablet daily 90 tablet 3    lisinopril (ZESTRIL) 10 MG tablet Take 1 tablet (10 mg) by mouth daily. 90 tablet 0     Current Facility-Administered Medications   Medication Dose Route Frequency Provider Last Rate Last Admin    medroxyPROGESTERone (DEPO-PROVERA) injection 150 mg  150 mg Intramuscular Q90 Days    150 mg at 09/24/24 1320       No Known Allergies      OBJECTIVE:  Please get a blood pressure AND a pulse.  A height is also needed if has not been done in the past year.    /82   Pulse 99   LMP 09/19/2024 (Exact Date)         If patient has diagnosis of HYPERTENSION, is there a goal on the problem list? No  Patient Active Problem List   Diagnosis    GANESH (generalized anxiety disorder)    Anxiety    Anemia, unspecified type       Plan:    Systolic BP    Greater than or equal to 100  OR Less than  140    Diastolic BP    Greater than or equal to 70 OR less than 90    Pulse    Pulse greater than 60 or less than 100      If all the above three parameters are met, patient to go home. Chart CC to Primary Care Provider  If any one of the above three parameters is not met notify RN or provider.

## 2024-12-15 ENCOUNTER — HEALTH MAINTENANCE LETTER (OUTPATIENT)
Age: 49
End: 2024-12-15

## 2025-01-30 DIAGNOSIS — I10 ESSENTIAL HYPERTENSION: ICD-10-CM

## 2025-01-30 RX ORDER — LISINOPRIL 10 MG/1
10 TABLET ORAL DAILY
Qty: 90 TABLET | Refills: 0 | Status: SHIPPED | OUTPATIENT
Start: 2025-01-30

## 2025-02-13 ENCOUNTER — TRANSFERRED RECORDS (OUTPATIENT)
Dept: HEALTH INFORMATION MANAGEMENT | Facility: CLINIC | Age: 50
End: 2025-02-13
Payer: COMMERCIAL

## 2025-02-21 ENCOUNTER — MYC MEDICAL ADVICE (OUTPATIENT)
Dept: FAMILY MEDICINE | Facility: CLINIC | Age: 50
End: 2025-02-21
Payer: COMMERCIAL

## 2025-02-24 ENCOUNTER — TELEPHONE (OUTPATIENT)
Dept: FAMILY MEDICINE | Facility: CLINIC | Age: 50
End: 2025-02-24
Payer: COMMERCIAL

## 2025-02-25 ENCOUNTER — VIRTUAL VISIT (OUTPATIENT)
Dept: FAMILY MEDICINE | Facility: CLINIC | Age: 50
End: 2025-02-25
Payer: COMMERCIAL

## 2025-02-25 DIAGNOSIS — I10 ESSENTIAL HYPERTENSION: ICD-10-CM

## 2025-02-25 DIAGNOSIS — R73.03 PREDIABETES: Primary | ICD-10-CM

## 2025-02-25 DIAGNOSIS — K59.09 CHRONIC CONSTIPATION: ICD-10-CM

## 2025-02-25 PROCEDURE — 98006 SYNCH AUDIO-VIDEO EST MOD 30: CPT | Performed by: PHYSICIAN ASSISTANT

## 2025-02-25 RX ORDER — POLYETHYLENE GLYCOL 3350 17 G/17G
1 POWDER, FOR SOLUTION ORAL DAILY
Qty: 850 G | Refills: 11 | Status: SHIPPED | OUTPATIENT
Start: 2025-02-25

## 2025-02-25 RX ORDER — LISINOPRIL 10 MG/1
10 TABLET ORAL DAILY
Qty: 90 TABLET | Refills: 2 | Status: SHIPPED | OUTPATIENT
Start: 2025-02-25

## 2025-02-25 NOTE — PROGRESS NOTES
"Janell is a 49 year old who is being evaluated via a billable video visit.    How would you like to obtain your AVS? MyChart  If the video visit is dropped, the invitation should be resent by: Send to e-mail at: [email or web address]  Will anyone else be joining your video visit? No      Assessment & Plan     Prediabetes  Reviewed lab tests completed.   A1C was 5.8%.   She is already on semaglutide. No new medications are indicated. Weight is stable and she eats healthy. I will continue to monitor her lab tests and add medication if indicated.   Plan to check A1C again in 6 months.   Unlikely the cause of neuropathy  - **Hemoglobin A1c FUTURE 6mo; Future    Chronic constipation  She uses over the counter and has requested a prescription.   - polyethylene glycol (MIRALAX) 17 GM/Dose powder; Take 17 g (1 Capful) by mouth daily.    Essential hypertension  Stable condition  - lisinopril (ZESTRIL) 10 MG tablet; Take 1 tablet (10 mg) by mouth daily.  - **Basic metabolic panel FUTURE 6mo; Future    The longitudinal plan of care for the diagnosis(es)/condition(s) as documented were addressed during this visit. Due to the added complexity in care, I will continue to support Janell in the subsequent management and with ongoing continuity of care.        BMI  Estimated body mass index is 25 kg/m  as calculated from the following:    Height as of 10/21/24: 1.715 m (5' 7.5\").    Weight as of 10/21/24: 73.5 kg (162 lb).             Subjective   Janell is a 49 year old, presenting for the following health issues:  Results (Discuss blood work)        2/25/2025    12:50 PM   Additional Questions   Roomed by Pal MCCLURE MA     HPI     Discuss lab results.      Janell is here today to discuss recent results completed by her Neurologist.   A1c is 5.8%.   Lab tests completed last fall also showed prediabetes with recommendation to work on healthy lifestyle changes and recheck annually.    She will be having more testing for " causes of the neuropathy in her feet.       She is on semaglutide for weight loss currently. She has been working through an online ap.   She maintains a weight around 165 pounds.             Review of Systems  Constitutional, HEENT, cardiovascular, pulmonary, GI, , musculoskeletal, neuro, skin, endocrine and psych systems are negative, except as otherwise noted.      Objective           Vitals:  No vitals were obtained today due to virtual visit.    Physical Exam   GENERAL: alert and no distress  EYES: Eyes grossly normal to inspection.  No discharge or erythema, or obvious scleral/conjunctival abnormalities.  RESP: No audible wheeze, cough, or visible cyanosis.    SKIN: Visible skin clear. No significant rash, abnormal pigmentation or lesions.  NEURO: Cranial nerves grossly intact.  Mentation and speech appropriate for age.  PSYCH: Appropriate affect, tone, and pace of words    Results reviewed from [facility] 2/13/2024.       Video-Visit Details    Type of service:  Video Visit   Originating Location (pt. Location): Home    Distant Location (provider location):  On-site  Platform used for Video Visit: Fabby  Signed Electronically by: Kristen M. Kehr, PA-C     no

## 2025-03-04 ENCOUNTER — TRANSFERRED RECORDS (OUTPATIENT)
Dept: HEALTH INFORMATION MANAGEMENT | Facility: CLINIC | Age: 50
End: 2025-03-04
Payer: COMMERCIAL

## 2025-03-24 ENCOUNTER — MYC MEDICAL ADVICE (OUTPATIENT)
Dept: FAMILY MEDICINE | Facility: CLINIC | Age: 50
End: 2025-03-24
Payer: COMMERCIAL

## 2025-03-24 NOTE — TELEPHONE ENCOUNTER
:  Can you share information for lab (I don't have a specific telephone number for lab)   This will help with her coordination of the tests requested by her provider.   Thank you.   Kristen Kehr PA-C

## 2025-03-31 ENCOUNTER — MYC REFILL (OUTPATIENT)
Dept: FAMILY MEDICINE | Facility: CLINIC | Age: 50
End: 2025-03-31
Payer: COMMERCIAL

## 2025-03-31 ENCOUNTER — MYC MEDICAL ADVICE (OUTPATIENT)
Dept: FAMILY MEDICINE | Facility: CLINIC | Age: 50
End: 2025-03-31
Payer: COMMERCIAL

## 2025-03-31 DIAGNOSIS — F41.9 ANXIETY: ICD-10-CM

## 2025-03-31 DIAGNOSIS — I10 ESSENTIAL HYPERTENSION: ICD-10-CM

## 2025-03-31 RX ORDER — LISINOPRIL 10 MG/1
10 TABLET ORAL DAILY
Qty: 90 TABLET | Refills: 2 | Status: SHIPPED | OUTPATIENT
Start: 2025-03-31

## 2025-03-31 RX ORDER — CITALOPRAM HYDROBROMIDE 40 MG/1
TABLET ORAL
Qty: 90 TABLET | Refills: 1 | Status: SHIPPED | OUTPATIENT
Start: 2025-03-31

## 2025-03-31 NOTE — TELEPHONE ENCOUNTER
New pharmacy for this refill request. Remaining refills sent to this new pharmacy.    Brenda Moreno BSN, RN

## 2025-04-01 RX ORDER — CITALOPRAM HYDROBROMIDE 40 MG/1
TABLET ORAL
Qty: 90 TABLET | Refills: 3 | OUTPATIENT
Start: 2025-04-01

## 2025-04-01 RX ORDER — LISINOPRIL 10 MG/1
10 TABLET ORAL DAILY
Qty: 90 TABLET | Refills: 2 | OUTPATIENT
Start: 2025-04-01

## 2025-04-08 ENCOUNTER — TRANSFERRED RECORDS (OUTPATIENT)
Dept: HEALTH INFORMATION MANAGEMENT | Facility: CLINIC | Age: 50
End: 2025-04-08
Payer: COMMERCIAL

## 2025-04-09 ENCOUNTER — MYC MEDICAL ADVICE (OUTPATIENT)
Dept: FAMILY MEDICINE | Facility: CLINIC | Age: 50
End: 2025-04-09
Payer: COMMERCIAL

## 2025-04-10 ENCOUNTER — MYC REFILL (OUTPATIENT)
Dept: FAMILY MEDICINE | Facility: CLINIC | Age: 50
End: 2025-04-10
Payer: COMMERCIAL

## 2025-04-10 DIAGNOSIS — K59.09 CHRONIC CONSTIPATION: ICD-10-CM

## 2025-04-10 RX ORDER — POLYETHYLENE GLYCOL 3350 17 G/17G
1 POWDER, FOR SOLUTION ORAL DAILY
Qty: 850 G | Refills: 1 | Status: SHIPPED | OUTPATIENT
Start: 2025-04-10

## 2025-04-10 NOTE — TELEPHONE ENCOUNTER
Medication passed protocol, however, refill RN could not approve because provider needs to review pharmacy/patient note. Provider, please approve or deny the prescription.

## 2025-04-14 ENCOUNTER — TELEPHONE (OUTPATIENT)
Dept: OBGYN | Facility: CLINIC | Age: 50
End: 2025-04-14
Payer: COMMERCIAL

## 2025-04-14 NOTE — TELEPHONE ENCOUNTER
[facility] Call Center    Phone Message    May a detailed message be left on voicemail: yes     Reason for Call: Appointment Intake    Referring Provider Name: Ana Archer APRN CNP  Diagnosis and/or Symptoms: Depo Injection  Pt requesting to schedule in [place]. [place] not on protocols, message read on secure chat but no response Please call pt to schedule Thank you    Action Taken: Message routed to:  Other: AN OBGYN    Travel Screening: Not Applicable

## 2025-04-14 NOTE — TELEPHONE ENCOUNTER
RN sent [facility] message with better number to call as pt also wrote in.    Darlyn James RN on 4/14/2025 at 2:07 PM

## 2025-04-15 ENCOUNTER — ALLIED HEALTH/NURSE VISIT (OUTPATIENT)
Dept: FAMILY MEDICINE | Facility: CLINIC | Age: 50
End: 2025-04-15
Payer: COMMERCIAL

## 2025-04-15 DIAGNOSIS — Z30.42 DEPO-PROVERA CONTRACEPTIVE STATUS: Primary | ICD-10-CM

## 2025-04-15 LAB — HCG UR QL: NEGATIVE

## 2025-04-15 PROCEDURE — 81025 URINE PREGNANCY TEST: CPT

## 2025-04-15 PROCEDURE — 99207 PR NO CHARGE NURSE ONLY: CPT

## 2025-04-15 RX ADMIN — MEDROXYPROGESTERONE ACETATE 150 MG: 150 INJECTION, SUSPENSION INTRAMUSCULAR at 09:51

## 2025-04-15 NOTE — PROGRESS NOTES
Clinic Administered Medication Documentation      Depo Provera Documentation    Depo-Provera Standing Order inclusion/exclusion criteria reviewed.     Is this the initial or subsequent dose of Depo Provera? Subsequent dose - patient is not within the acceptable window of time (11-15 weeks) for subsequent injection. Pregnancy test is indicated. Pregnancy test result: negative       Patient meets: inclusion criteria     Is there an active order (written within the past 365 days, with administrations remaining, not ) in the chart? Yes.     Prior to injection, verified patient identity using patient's name and date of birth. Medication was administered. Please see MAR and medication order for additional information.     Vial/Syringe: Single dose vial. Was entire vial of medication used? Yes    Patient instructed to remain in clinic for 15 minutes and report any adverse reaction to staff immediately but patient declined.  NEXT INJECTION DUE: 25 - 25    Verified that the patient has refills remaining in their prescription.

## 2025-04-17 ENCOUNTER — VIRTUAL VISIT (OUTPATIENT)
Dept: FAMILY MEDICINE | Facility: CLINIC | Age: 50
End: 2025-04-17
Payer: COMMERCIAL

## 2025-04-17 DIAGNOSIS — Z01.89 PATIENT REQUESTED DIAGNOSTIC TESTING: Primary | ICD-10-CM

## 2025-04-17 NOTE — PROGRESS NOTES
"Janell is a 49 year old who is being evaluated via a billable video visit.    How would you like to obtain your AVS? MyChart  If the video visit is dropped, the invitation should be resent by: Text to cell phone: [phone]  Will anyone else be joining your video visit? No      Assessment & Plan     Patient requested diagnostic testing  She sent a list of specialty tests.   I have already ordered lipids and she can schedule a lab only appointment to get this completed.   Explained that she can have her functional medicine provider fax the orders to our laboratory with orders so that they will receive the results that they have requested.   There is no guarantee that her insurance will pay for the lab tests.       She will not be billed for this encounter.       BMI  Estimated body mass index is 25 kg/m  as calculated from the following:    Height as of 10/21/24: 1.715 m (5' 7.5\").    Weight as of 10/21/24: 73.5 kg (162 lb).             Subjective   Janell is a 49 year old, presenting for the following health issues:  LAB REQUEST        4/17/2025     8:58 AM   Additional Questions   Roomed by Pal DEL ROSARIO      Requesting labs per patient, will discuss with provider.   Janell is working with a functional medicine provider.   They have ordered tests and she is wondering if she can have them completed through [facility].               Review of Systems  Constitutional, HEENT, cardiovascular, pulmonary, GI, , musculoskeletal, neuro, skin, endocrine and psych systems are negative, except as otherwise noted.      Objective           Vitals:  No vitals were obtained today due to virtual visit.    Physical Exam   GENERAL: alert and no distress  EYES: Eyes grossly normal to inspection.  No discharge or erythema, or obvious scleral/conjunctival abnormalities.  RESP: No audible wheeze, cough, or visible cyanosis.    SKIN: Visible skin clear. No significant rash, abnormal pigmentation or " lesions.  NEURO: Cranial nerves grossly intact.  Mentation and speech appropriate for age.  PSYCH: Appropriate affect, tone, and pace of words          Video-Visit Details    Type of service:  Video Visit   Originating Location (pt. Location): Home    Distant Location (provider location):  On-site  Platform used for Video Visit: Fabby  Signed Electronically by: Kristen M. Kehr, PA-C

## 2025-04-17 NOTE — PATIENT INSTRUCTIONS
Colonoscopy scheduling:  Comment: Please be aware that coverage of these services is subject to the terms and limitations of your health insurance plan.  Call member services at your health plan with any benefit or coverage questions.   [facility] will call you to coordinate your care as prescribed by the provider.  If you don t hear from a representative within 2 business days, please call [phone].

## 2025-04-20 ENCOUNTER — MYC MEDICAL ADVICE (OUTPATIENT)
Dept: FAMILY MEDICINE | Facility: CLINIC | Age: 50
End: 2025-04-20
Payer: COMMERCIAL

## 2025-04-25 ENCOUNTER — MYC MEDICAL ADVICE (OUTPATIENT)
Dept: FAMILY MEDICINE | Facility: CLINIC | Age: 50
End: 2025-04-25
Payer: COMMERCIAL